# Patient Record
Sex: MALE | HISPANIC OR LATINO | Employment: FULL TIME | URBAN - METROPOLITAN AREA
[De-identification: names, ages, dates, MRNs, and addresses within clinical notes are randomized per-mention and may not be internally consistent; named-entity substitution may affect disease eponyms.]

---

## 2017-11-03 ENCOUNTER — OFFICE VISIT (OUTPATIENT)
Dept: MEDICAL GROUP | Facility: MEDICAL CENTER | Age: 22
End: 2017-11-03
Payer: COMMERCIAL

## 2017-11-03 VITALS
WEIGHT: 225 LBS | BODY MASS INDEX: 35.31 KG/M2 | DIASTOLIC BLOOD PRESSURE: 82 MMHG | RESPIRATION RATE: 16 BRPM | HEIGHT: 67 IN | TEMPERATURE: 97.8 F | OXYGEN SATURATION: 94 % | SYSTOLIC BLOOD PRESSURE: 130 MMHG | HEART RATE: 87 BPM

## 2017-11-03 DIAGNOSIS — G47.9 SLEEP DISTURBANCE: ICD-10-CM

## 2017-11-03 DIAGNOSIS — Z81.1: ICD-10-CM

## 2017-11-03 DIAGNOSIS — E66.9 OBESITY (BMI 30-39.9): ICD-10-CM

## 2017-11-03 DIAGNOSIS — F41.9 ANXIETY: ICD-10-CM

## 2017-11-03 DIAGNOSIS — F32.A FATIGUE DUE TO DEPRESSION: ICD-10-CM

## 2017-11-03 DIAGNOSIS — Z78.9 ALCOHOL USE: ICD-10-CM

## 2017-11-03 DIAGNOSIS — Z91.52: ICD-10-CM

## 2017-11-03 DIAGNOSIS — R53.83 FATIGUE DUE TO DEPRESSION: ICD-10-CM

## 2017-11-03 DIAGNOSIS — F33.2 SEVERE EPISODE OF RECURRENT MAJOR DEPRESSIVE DISORDER, WITHOUT PSYCHOTIC FEATURES (HCC): ICD-10-CM

## 2017-11-03 PROCEDURE — 99204 OFFICE O/P NEW MOD 45 MIN: CPT | Performed by: NURSE PRACTITIONER

## 2017-11-03 RX ORDER — BUPROPION HYDROCHLORIDE 150 MG/1
150 TABLET ORAL EVERY MORNING
Qty: 90 TAB | Refills: 0 | Status: SHIPPED | OUTPATIENT
Start: 2017-11-03 | End: 2017-12-15 | Stop reason: SDUPTHER

## 2017-11-03 ASSESSMENT — PATIENT HEALTH QUESTIONNAIRE - PHQ9
SUM OF ALL RESPONSES TO PHQ QUESTIONS 1-9: 22
CLINICAL INTERPRETATION OF PHQ2 SCORE: 3
5. POOR APPETITE OR OVEREATING: 3 - NEARLY EVERY DAY

## 2017-11-03 NOTE — LETTER
[facility]  AJ SantosP.R.N.  [address] [facility] [address]  [place] [address]  Fax: [phone]   Authorization for Release/Disclosure of   Protected Health Information   Name: CIRO RIOS : 1995 SSN: [id number]   Address: [address]  [place] [address] Phone:    [phone] (home)    I authorize the entity listed below to release/disclose the PHI below to:   [facility]/Sumi Walker A.P.R.N. and CONG Santos.P.R.N.   Provider or Entity Name:  [facility]   Address   City, State, Zip  [place] Phone:      Fax:     Reason for request: continuity of care   Information to be released:    [  ] LAST COLONOSCOPY,  including any PATH REPORT and follow-up  [  ] LAST FIT/COLOGUARD RESULT [  ] LAST DEXA  [  ] LAST MAMMOGRAM  [  ] LAST PAP  [  ] LAST LABS [  ] RETINA EXAM REPORT  [  ] IMMUNIZATION RECORDS  [ x ] Release all info      [  ] Check here and initial the line next to each item to release ALL health information INCLUDING  _____ Care and treatment for drug and / or alcohol abuse  _____ HIV testing, infection status, or AIDS  _____ Genetic Testing    DATES OF SERVICE OR TIME PERIOD TO BE DISCLOSED: _____________  I understand and acknowledge that:  * This Authorization may be revoked at any time by you in writing, except if your health information has already been used or disclosed.  * Your health information that will be used or disclosed as a result of you signing this authorization could be re-disclosed by the recipient. If this occurs, your re-disclosed health information may no longer be protected by State or Federal laws.  * You may refuse to sign this Authorization. Your refusal will not affect your ability to obtain treatment.  * This Authorization becomes effective upon signing and will  on (date) __________.      If no date is indicated, this Authorization will  one (1) year from the signature date.    Name: Ciro Rios    Signature:   Date:     11/3/2017       PLEASE FAX REQUESTED RECORDS BACK TO: [phone]

## 2017-11-03 NOTE — PROGRESS NOTES
CC: Depression      Ciro Rios is a 21 y.o. male here to establish care and to discuss the evaluation and management of:    1. Severe episode of recurrent major depressive disorder, without psychotic features (CMS-HCC)  2. Anxiety  3. Hx of self mutilation  New problem. Patient states that he has been suffering from depression and anxiety for several years. States he feels tired, has had weight gain, difficulty sleeping, feelings of worthlessness, distraction, excessive worry, and difficulty concentrating. He states that he has been going to substance abuse counseling which was court ordered from a DUI he received over 2 years ago. He states that he has gotten extension on completing this court mandated counseling which she goes to once a week for approximately 1 hour. Says that he feels like counseling is helpful for him. Substance of choice was marijuana. He states that he has been trying to quit smoking marijuana, and decrease his alcohol consumption. States he has approximately 40 ounces of malt liquor every other day, and has decreased his marijuana use, last use was about 2 weeks ago. Patient states that more than 2 years ago he had tried Effexor for a few months and states that it was not effective. No suicidal ideations at this time, denies a plan, denies auditory hallucinations, or visual hallucinations. However states that he told his substance abuse counselor that he feels like that would be the way that he would die. Patient admits to self inflicting by cutting on his arm, last episode of cutting was a few months ago. States that he has some family disruption as his father is an alcoholic, and has health problems. He reports that his parents seem to argue, especially when his dad is drinking alcohol. He lives does have a few siblings however he is the only one that currently lives at home. 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 However does state  that he has some lack of focus, decreased ability to concentrate while he is at work. He states that his counselor had recommended that he see a memory care doctor to potentially start a medication.  4. Alcohol use: Patient states that he has presently one 40 ounce malt liquor every other day. States because he decreased his marijuana use sometimes he goes and has some malt liquor instead.  5. Fatigue due to depression   6. Sleep disturbance   States that he can sometimes sleep for as long as 12 hours, however sometimes states that he often gets approximately 3 hours of sleep.    7. Obesity (BMI 30-39.9)  States that he has gained weight over the last year two. He currently is not active, and has a poor appetite. States increased excess calories due to smoking marijuana. States that he is interested in going to exercise, he does have a gym membership however does not go currently. States he has no motivation.      ROS:  Denies any Headache,Chest pain,  Shortness of breath,  Abdominal pain, Changes of bowel or bladder, Lower ext edema, Fevers, Nights sweats, Weight Changes, Focal weakness or numbness.      Current Outpatient Prescriptions:   •  buPROPion (WELLBUTRIN XL) 150 MG XL tablet, Take 1 Tab by mouth every morning., Disp: 90 Tab, Rfl: 0    No Known Allergies    History reviewed. No pertinent past medical history.  History reviewed. No pertinent surgical history.  Family History   Problem Relation Age of Onset   • Alcohol abuse Father    • No Known Problems Sister      kidney tranplants   • Heart Attack Maternal Grandfather    • Diabetes Paternal Grandmother    • No Known Problems Paternal Grandfather      parkinsons     Social History     Social History   • Marital status: Single     Spouse name: N/A   • Number of children: N/A   • Years of education: N/A     Occupational History   • Not on file.     Social History Main Topics   • Smoking status: Former Smoker     Packs/day: 0.25     Years: 5.00     Types:  "Cigarettes     Quit date: 10/20/2017   • Smokeless tobacco: Never Used   • Alcohol use Yes      Comment: 40 oz malt /every other day   • Drug use: No      Comment: stopped 2 wks ago   • Sexual activity: Not Currently     Other Topics Concern   • Not on file     Social History Narrative    Works at [employer] as a manager       Objective:     Vitals: /82   Pulse 87   Temp 36.6 °C (97.8 °F)   Resp 16   Ht 1.702 m (5' 7\")   Wt 102.1 kg (225 lb)   SpO2 94%   BMI 35.24 kg/m²      General: Alert, pleasant, NAD  HEENT:  Normocephalic.  PERRL, no icterus or pallor.  Conjunctivae and lids normal.  External ears normal. Tympanic membranes pearly, opaque.  Nares patent, mucosa pink.  Oropharynx non-erythematous, mucous membranes moist.  Neck supple.  No thyromegaly or masses palpated. No cervical or supraclavicular lymphadenopathy.  Heart:  Regular rate and rhythm.  S1 and S2 normal.  No murmurs appreciated.  Respiratory:  Normal respiratory effort.  Clear to auscultation bilaterally.    Abdomen:  Bowel sounds present.  Non-distended, soft, non-tender, no guarding/rebound. No hepatosplenomegaly.  No hernias.  Skin:  Warm, dry, no rashes, scars on left inner forearm due to cutting. Healed, no erythema.  Musculoskeletal:  Gait is normal.  Moves all extremities well.  Extremities:  Pedal pulses 2+ symmetric. No leg edema.  Neurological: No tremors, sensation grossly intact, patellar and biceps reflexes 2+ symmetric, tone/strength normal, gait is normal, no clonus  Psych:  Affect/mood is normal, judgement is good, memory is intact, grooming is appropriate.Flat affect      Assessment and Plan.   21 y.o. male here to establish care and discuss the followin. Severe episode of recurrent major depressive disorder, without psychotic features (CMS-HCC)  2. Anxiety  3. Hx of self mutilation  Upon taking the PQH9-he scored a 24, on the JOHANNA score 21. Based on patient's reported symptoms he would potentially benefit from " counseling and a antidepressant. He does not have any suicidal ideations at this current time, nor a plan. Pt does have scars on left upper forearm. Denies any auditory or visual hallucinations,   he willcontinue his substance abuse counseling as ordered. Also referred also over to behavioral health for when the court ordered counseling has discontinued. Patient would benefit from psychological therapeutic assistance.  Prescription written for Wellbutrin  daily. Will follow up in 4-6 weeks to follow-up on medication .  - REFERRAL TO BEHAVIORAL HEALTH  4. Alcohol use  Active. Not a binge drinker, does have one 40 ounce every other day. Discussed the importance of decreasing alcohol intake as this can increase his depressive symptoms. Also, his father drinks in excess. Discussed implications of substituting 1 addictive substance for another as this can exacerbate    addiction. Patient will attempt to decrease alcohol consumption.  5. Fatigue due to depression  Not well managed at this time. Hopefully after some time on the new medication he will be able to develop a better sleep pattern. Discussed waking up at a set time every single day to take his medication discussed decreasing caffeine intake.  - CBC WITHOUT DIFFERENTIAL; Future  - TSH WITH REFLEX TO FT4; Future    6. Sleep disturbance  Not well managed. Discussed importance of implementing sleep hygiene. We will rule out any physiological associations.  - CBC WITHOUT DIFFERENTIAL; Future  - TSH WITH REFLEX TO FT4; Future    7. Obesity (BMI 30-39.9)  Chronic. Discussed with patient healthy eating habits, decreasing sugary drinks, soda, hypertension, decreasing alcohol intake, and decreasing marijuana use. Discussed beginning exercises with her be walking for 10-20 minutes every day to help improve his cardiovascular system and to decrease weight. Discussed how this can also help improve his mood if he began exercising on a routine basis. Patient states that  he will try to incorporate this in his daily activities.  - Patient identified as having weight management issue.  Appropriate orders and counseling given.      Return in about 6 weeks (around 12/15/2017) for Med Check.        Sumi LAGUNA.

## 2017-12-15 ENCOUNTER — OFFICE VISIT (OUTPATIENT)
Dept: MEDICAL GROUP | Facility: MEDICAL CENTER | Age: 22
End: 2017-12-15
Payer: COMMERCIAL

## 2017-12-15 VITALS
TEMPERATURE: 98.6 F | DIASTOLIC BLOOD PRESSURE: 80 MMHG | WEIGHT: 217 LBS | SYSTOLIC BLOOD PRESSURE: 122 MMHG | RESPIRATION RATE: 16 BRPM | BODY MASS INDEX: 34.06 KG/M2 | HEART RATE: 82 BPM | OXYGEN SATURATION: 94 % | HEIGHT: 67 IN

## 2017-12-15 DIAGNOSIS — F32.A FATIGUE DUE TO DEPRESSION: ICD-10-CM

## 2017-12-15 DIAGNOSIS — R53.83 FATIGUE DUE TO DEPRESSION: ICD-10-CM

## 2017-12-15 DIAGNOSIS — Z78.9 ALCOHOL USE: ICD-10-CM

## 2017-12-15 DIAGNOSIS — F41.9 ANXIETY: ICD-10-CM

## 2017-12-15 DIAGNOSIS — F33.2 SEVERE EPISODE OF RECURRENT MAJOR DEPRESSIVE DISORDER, WITHOUT PSYCHOTIC FEATURES (HCC): ICD-10-CM

## 2017-12-15 PROCEDURE — 99214 OFFICE O/P EST MOD 30 MIN: CPT | Performed by: NURSE PRACTITIONER

## 2017-12-15 RX ORDER — BUPROPION HYDROCHLORIDE 300 MG/1
300 TABLET ORAL EVERY MORNING
Qty: 30 TAB | Refills: 1 | Status: SHIPPED | OUTPATIENT
Start: 2017-12-15 | End: 2018-11-25 | Stop reason: CLARIF

## 2017-12-15 ASSESSMENT — PATIENT HEALTH QUESTIONNAIRE - PHQ9
SUM OF ALL RESPONSES TO PHQ QUESTIONS 1-9: 17
5. POOR APPETITE OR OVEREATING: 1 - SEVERAL DAYS
CLINICAL INTERPRETATION OF PHQ2 SCORE: 4

## 2017-12-15 NOTE — PATIENT INSTRUCTIONS
Bupropion extended-release tablets (Depression/Mood Disorders)  What is this medicine?  BUPROPION (byoo PROE pee on) is used to treat depression.  This medicine may be used for other purposes; ask your health care provider or pharmacist if you have questions.  COMMON BRAND NAME(S): Aplenzin, Budeprion XL , Forfivo XL, Wellbutrin XL  What should I tell my health care provider before I take this medicine?  They need to know if you have any of these conditions:  -an eating disorder, such as anorexia or bulimia  -bipolar disorder or psychosis  -diabetes or high blood sugar, treated with medication  -head injury or brain tumor  -heart disease, previous heart attack, or irregular heart beat  -high blood pressure  -kidney or liver disease  -seizures (convulsions)  -suicidal thoughts or a previous suicide attempt  -Tourette's syndrome  -weight loss  -an unusual or allergic reaction to bupropion, other medicines, foods, dyes, or preservatives  -breast-feeding  -pregnant or trying to become pregnant  How should I use this medicine?  Take this medicine by mouth with a glass of water. Follow the directions on the prescription label. You can take it with or without food. If it upsets your stomach, take it with food. Do not crush, chew, or cut these tablets. This medicine is taken once daily at the same time each day. Do not take your medicine more often than directed. Do not stop taking this medicine suddenly except upon the advice of your doctor. Stopping this medicine too quickly may cause serious side effects or your condition may worsen.  A special MedGuide will be given to you by the pharmacist with each prescription and refill. Be sure to read this information carefully each time.  Talk to your pediatrician regarding the use of this medicine in children. Special care may be needed.  Overdosage: If you think you have taken too much of this medicine contact a poison control center or emergency room at once.  NOTE: This  medicine is only for you. Do not share this medicine with others.  What if I miss a dose?  If you miss a dose, skip the missed dose and take your next tablet at the regular time. Do not take double or extra doses.  What may interact with this medicine?  Do not take this medicine with any of the following medications:  -linezolid  -MAOIs like Azilect, Carbex, Eldepryl, Marplan, Nardil, and Parnate  -methylene blue (injected into a vein)  -other medicines that contain bupropion like Zyban  This medicine may also interact with the following medications:  -alcohol  -certain medicines for anxiety or sleep  -certain medicines for blood pressure like metoprolol, propranolol  -certain medicines for depression or psychotic disturbances  -certain medicines for HIV or AIDS like efavirenz, lopinavir, nelfinavir, ritonavir  -certain medicines for irregular heart beat like propafenone, flecainide  -certain medicines for Parkinson's disease like amantadine, levodopa  -certain medicines for seizures like carbamazepine, phenytoin, phenobarbital  -cimetidine  -clopidogrel  -cyclophosphamide  -furazolidone  -isoniazid  -nicotine  -orphenadrine  -procarbazine  -steroid medicines like prednisone or cortisone  -stimulant medicines for attention disorders, weight loss, or to stay awake  -tamoxifen  -theophylline  -thiotepa  -ticlopidine  -tramadol  -warfarin  This list may not describe all possible interactions. Give your health care provider a list of all the medicines, herbs, non-prescription drugs, or dietary supplements you use. Also tell them if you smoke, drink alcohol, or use illegal drugs. Some items may interact with your medicine.  What should I watch for while using this medicine?  Tell your doctor if your symptoms do not get better or if they get worse. Visit your doctor or health care professional for regular checks on your progress. Because it may take several weeks to see the full effects of this medicine, it is important  to continue your treatment as prescribed by your doctor.  Patients and their families should watch out for new or worsening thoughts of suicide or depression. Also watch out for sudden changes in feelings such as feeling anxious, agitated, panicky, irritable, hostile, aggressive, impulsive, severely restless, overly excited and hyperactive, or not being able to sleep. If this happens, especially at the beginning of treatment or after a change in dose, call your health care professional.  Avoid alcoholic drinks while taking this medicine. Drinking large amounts of alcoholic beverages, using sleeping or anxiety medicines, or quickly stopping the use of these agents while taking this medicine may increase your risk for a seizure.  Do not drive or use heavy machinery until you know how this medicine affects you. This medicine can impair your ability to perform these tasks.  Do not take this medicine close to bedtime. It may prevent you from sleeping.  Your mouth may get dry. Chewing sugarless gum or sucking hard candy, and drinking plenty of water may help. Contact your doctor if the problem does not go away or is severe.  The tablet shell for some brands of this medicine does not dissolve. This is normal. The tablet shell may appear whole in the stool. This is not a cause for concern.  What side effects may I notice from receiving this medicine?  Side effects that you should report to your doctor or health care professional as soon as possible:  -allergic reactions like skin rash, itching or hives, swelling of the face, lips, or tongue  -breathing problems  -changes in vision  -confusion  -fast or irregular heartbeat  -hallucinations  -increased blood pressure  -redness, blistering, peeling or loosening of the skin, including inside the mouth  -seizures  -suicidal thoughts or other mood changes  -unusually weak or tired  -vomiting  Side effects that usually do not require medical attention (report to your doctor or  health care professional if they continue or are bothersome):  -change in sex drive or performance  -constipation  -headache  -loss of appetite  -nausea  -tremors  -weight loss  This list may not describe all possible side effects. Call your doctor for medical advice about side effects. You may report side effects to FDA at 3-521-ZYS-2325.  Where should I keep my medicine?  Keep out of the reach of children.  Store at room temperature between 15 and 30 degrees C (59 and 86 degrees F). Throw away any unused medicine after the expiration date.  NOTE: This sheet is a summary. It may not cover all possible information. If you have questions about this medicine, talk to your doctor, pharmacist, or health care provider.  © 2014, [employer]. (5/6/2013 2:02:06 PM)  Depression, Adult  Depression refers to feeling sad, low, down in the dumps, blue, gloomy, or empty. In general, there are two kinds of depression:  1. Normal sadness or normal grief. This kind of depression is one that we all feel from time to time after upsetting life experiences, such as the loss of a job or the ending of a relationship. This kind of depression is considered normal, is short lived, and resolves within a few days to 2 weeks. Depression experienced after the loss of a loved one (bereavement) often lasts longer than 2 weeks but normally gets better with time.  2. Clinical depression. This kind of depression lasts longer than normal sadness or normal grief or interferes with your ability to function at home, at work, and in school. It also interferes with your personal relationships. It affects almost every aspect of your life. Clinical depression is an illness.  Symptoms of depression can also be caused by conditions other than those mentioned above, such as:  · Physical illness. Some physical illnesses, including underactive thyroid gland (hypothyroidism), severe anemia, specific types of cancer, diabetes, uncontrolled seizures,  heart and lung problems, strokes, and chronic pain are commonly associated with symptoms of depression.  · Side effects of some prescription medicine. In some people, certain types of medicine can cause symptoms of depression.  · Substance abuse. Abuse of alcohol and illicit drugs can cause symptoms of depression.  SYMPTOMS  Symptoms of normal sadness and normal grief include the following:  · Feeling sad or crying for short periods of time.  · Not caring about anything (apathy).  · Difficulty sleeping or sleeping too much.  · No longer able to enjoy the things you used to enjoy.  · Desire to be by oneself all the time (social isolation).  · Lack of energy or motivation.  · Difficulty concentrating or remembering.  · Change in appetite or weight.  · Restlessness or agitation.  Symptoms of clinical depression include the same symptoms of normal sadness or normal grief and also the following symptoms:  · Feeling sad or crying all the time.  · Feelings of guilt or worthlessness.  · Feelings of hopelessness or helplessness.  · Thoughts of suicide or the desire to harm yourself (suicidal ideation).  · Loss of touch with reality (psychotic symptoms). Seeing or hearing things that are not real (hallucinations) or having false beliefs about your life or the people around you (delusions and paranoia).  DIAGNOSIS   The diagnosis of clinical depression is usually based on how bad the symptoms are and how long they have lasted. Your health care provider will also ask you questions about your medical history and substance use to find out if physical illness, use of prescription medicine, or substance abuse is causing your depression. Your health care provider may also order blood tests.  TREATMENT   Often, normal sadness and normal grief do not require treatment. However, sometimes antidepressant medicine is given for bereavement to ease the depressive symptoms until they resolve.  The treatment for clinical depression depends  on how bad the symptoms are but often includes antidepressant medicine, counseling with a mental health professional, or both. Your health care provider will help to determine what treatment is best for you.  Depression caused by physical illness usually goes away with appropriate medical treatment of the illness. If prescription medicine is causing depression, talk with your health care provider about stopping the medicine, decreasing the dose, or changing to another medicine.  Depression caused by the abuse of alcohol or illicit drugs goes away when you stop using these substances. Some adults need professional help in order to stop drinking or using drugs.  SEEK IMMEDIATE MEDICAL CARE IF:  · You have thoughts about hurting yourself or others.  · You lose touch with reality (have psychotic symptoms).  · You are taking medicine for depression and have a serious side effect.  FOR MORE INFORMATION  · National [place] on Mental Illness: www.[place].org   · National [place] of Mental Health: www.nimh.nih.gov      This information is not intended to replace advice given to you by your health care provider. Make sure you discuss any questions you have with your health care provider.     Document Released: 12/15/2001 Document Revised: 01/08/2016 Document Reviewed: 03/18/2013  [employer] Interactive Patient Education ©2016 [employer]

## 2017-12-15 NOTE — PROGRESS NOTES
"cc: Follow-up depression    Subjective:     HPI:     Ciro Rios is a 22 y.o. male here to discuss the evaluation and management of:    1. Severe episode of recurrent major depressive disorder, without psychotic features (CMS-HCC)  2. Anxiety  3. Fatigue due to depression  Patient states that he feels like his depression is \"less intense.\" Patient states he's been taking his Wellbutrin  daily without any side effects. States his sleep hasn't improved that much. States that he still has some anxiety especially at work when there is a long line of customers. States that when he becomes overwhelmed he needs to go outside or in the bathroom to clear his head. States that this can happen daily or not at all during the week. States that he still sometimes have self-mutilation/suicidal thoughts however does not have a plan for suicide, there are no guns in the home per patient. States he still lives with his parents and they continue to argue a lot. He still is going to substance abuse counseling at [facility]. States he had to switch counselors and knees going once a week. States that this will last approximately until March.    4. Alcohol use  Patient states that he still consumes alcohol, also reports decreased amount. States he has approximately 2 beers per week.        ROS:  Denies any Headache, Blurred Vision, Confusion Chest pain,  Shortness of breath,  Abdominal pain, Changes of bowel or bladder, Lower ext edema, Fevers, Nights sweats, Weight Changes, Focal weakness or numbness.  All other systems are negative.        Current Outpatient Prescriptions:   •  buPROPion (WELLBUTRIN XL) 300 MG XL tablet, Take 1 Tab by mouth every morning., Disp: 30 Tab, Rfl: 1    No Known Allergies    History reviewed. No pertinent past medical history.  History reviewed. No pertinent surgical history.  Family History   Problem Relation Age of Onset   • Alcohol abuse Father    • No Known Problems Sister      kidney tranplants   • " "Heart Attack Maternal Grandfather    • Diabetes Paternal Grandmother    • No Known Problems Paternal Grandfather      parkinsons     Social History     Social History   • Marital status: Single     Spouse name: N/A   • Number of children: N/A   • Years of education: N/A     Occupational History   • Not on file.     Social History Main Topics   • Smoking status: Former Smoker     Packs/day: 0.25     Years: 5.00     Types: Cigarettes     Quit date: 10/20/2017   • Smokeless tobacco: Never Used   • Alcohol use Yes      Comment: 40 oz malt /every other day   • Drug use:      Types: Marijuana   • Sexual activity: Not Currently     Other Topics Concern   • Not on file     Social History Narrative    Works at [employer] as a manager       Objective:     Vitals: /80   Pulse 82   Temp 37 °C (98.6 °F)   Resp 16   Ht 1.702 m (5' 7\")   Wt 98.4 kg (217 lb)   SpO2 94%   BMI 33.99 kg/m²    General: Alert, pleasant, NAD  HEENT: Normocephalic. Neck supple.  No thyromegaly or masses palpated. No cervical or supraclavicular lymphadenopathy.  Heart: Regular rate and rhythm.  S1 and S2 normal.  No murmurs appreciated.  Respiratory: Normal respiratory effort.  Clear to auscultation bilaterally.  Skin: Warm, dry, no rashes.  Musculoskeletal: Gait is normal.  Moves all extremities well.  Extremities: No leg edema.   Neurological: No tremors, sensation grossly intact  Psych:  Judgement is good, memory is intact, grooming is appropriate. Flat affect, withdrawn    Assessment/Plan:     Ciro was seen today for follow-up.    Diagnoses and all orders for this visit:    Severe episode of recurrent major depressive disorder, without psychotic features (CMS-HCC)  Anxiety  Fatigue due to depression  Chronic. Scored a 17 on the PQH9 at this visit, last visit scored 24. Depression improving however anxiety remains the same. Continues to have intermittent thoughts of harming himself however does not have a plan, no guns in the home. After " discussing with patient and agreed that we will increase the dose of his Wellbutrin at this time and then follow up to reassess. Increase Wellbutrin to 300 mg daily follow-up in 4 weeks to assess symptoms and if possibility of changing to new medication that can help with anxiety reduction as well. Continue weekly counseling with substance abuse counselor. Patient states he still has referral that was previously placed to behavioral health however does not want to start that yet as he will finish his substance abuse counseling 1st. Patient agreeable to this plan.    -     buPROPion (WELLBUTRIN XL) 300 MG XL tablet; Take 1 Tab by mouth every morning.  -     Patient has been identified as being depressed and appropriate orders and counseling have been given      Alcohol use  Chronic. Patient states is approximately having 2 beers per week, advised against this. Discussed with patient that it is not advised to consume excessive amounts of alcohol especially with his severe depression. Discussed with patient that increasing alcohol intake may increase his depression and subsequently increasing suicidal ideations. Try to reduce alcohol intake.       Health care Maintenance: Labs still pending from previous visit.    Return in about 4 weeks (around 1/12/2018) for Med Check.          Sumi NUNEZ

## 2018-11-24 PROCEDURE — 99285 EMERGENCY DEPT VISIT HI MDM: CPT

## 2018-11-25 ENCOUNTER — APPOINTMENT (OUTPATIENT)
Dept: RADIOLOGY | Facility: MEDICAL CENTER | Age: 23
End: 2018-11-25
Attending: EMERGENCY MEDICINE
Payer: COMMERCIAL

## 2018-11-25 ENCOUNTER — HOSPITAL ENCOUNTER (EMERGENCY)
Facility: MEDICAL CENTER | Age: 23
End: 2018-11-26
Attending: EMERGENCY MEDICINE
Payer: COMMERCIAL

## 2018-11-25 DIAGNOSIS — F12.10 MARIJUANA ABUSE: ICD-10-CM

## 2018-11-25 DIAGNOSIS — S60.222A CONTUSION OF LEFT HAND, INITIAL ENCOUNTER: ICD-10-CM

## 2018-11-25 DIAGNOSIS — R45.851 SUICIDAL IDEATION: ICD-10-CM

## 2018-11-25 LAB
AMPHET UR QL SCN: NEGATIVE
BARBITURATES UR QL SCN: NEGATIVE
BENZODIAZ UR QL SCN: NEGATIVE
BZE UR QL SCN: NEGATIVE
CANNABINOIDS UR QL SCN: POSITIVE
METHADONE UR QL SCN: NEGATIVE
OPIATES UR QL SCN: NEGATIVE
OXYCODONE UR QL SCN: NEGATIVE
PCP UR QL SCN: NEGATIVE
POC BREATHALIZER: 0.01 PERCENT (ref 0–0.01)
PROPOXYPH UR QL SCN: NEGATIVE

## 2018-11-25 PROCEDURE — 73130 X-RAY EXAM OF HAND: CPT | Mod: LT

## 2018-11-25 PROCEDURE — 700102 HCHG RX REV CODE 250 W/ 637 OVERRIDE(OP): Performed by: EMERGENCY MEDICINE

## 2018-11-25 PROCEDURE — A9270 NON-COVERED ITEM OR SERVICE: HCPCS | Performed by: EMERGENCY MEDICINE

## 2018-11-25 PROCEDURE — 80307 DRUG TEST PRSMV CHEM ANLYZR: CPT

## 2018-11-25 PROCEDURE — 302970 POC BREATHALIZER: Performed by: EMERGENCY MEDICINE

## 2018-11-25 PROCEDURE — 90791 PSYCH DIAGNOSTIC EVALUATION: CPT

## 2018-11-25 RX ORDER — LORAZEPAM 1 MG/1
2 TABLET ORAL ONCE
Status: COMPLETED | OUTPATIENT
Start: 2018-11-25 | End: 2018-11-25

## 2018-11-25 RX ORDER — LORAZEPAM 1 MG/1
1 TABLET ORAL EVERY 6 HOURS PRN
Status: DISCONTINUED | OUTPATIENT
Start: 2018-11-25 | End: 2018-11-26

## 2018-11-25 RX ADMIN — LORAZEPAM 2 MG: 1 TABLET ORAL at 01:15

## 2018-11-25 RX ADMIN — LORAZEPAM 1 MG: 1 TABLET ORAL at 18:02

## 2018-11-25 RX ADMIN — LORAZEPAM 1 MG: 1 TABLET ORAL at 10:04

## 2018-11-25 NOTE — ED PROVIDER NOTES
ED Provider Note    11/25/18  1056  11/25/18  0613  11/25/18  0051  11/25/18  0011        Vital Signs     Temp  36.1 °C (96.9 °F)  36.5 °C (97.7 °F)         Temp src  Temporal  Temporal         BP  128/79  120/63         Patient BP Position  Sitting           BP Location  Right;Upper Arm  Left;Upper Arm         BP Method  Automatic  Automatic         Pulse  101  102         Resp  16  14         SpO2    96 %         O2 (LPM)    0         I have ordered as needed oral Ativan every 6 hours.  The vital signs were noted to be consistent with mild tachycardia.  The patient has been eating and drinking.  No active issues currently we are still awaiting acceptance at a psychiatric facility

## 2018-11-25 NOTE — ED PROVIDER NOTES
"ED Provider Note    Scribed for Darline Brown M.D. by Adrian Justice. 11/25/2018  12:18 AM    Means of arrival: Walk in  History obtained from: Patient  History limited by: None      CHIEF COMPLAINT  Chief Complaint   Patient presents with   • Psych Eval     pt states he \"wont live throught this\" \"its not worth it anymore\" when asked if he felt like hurting himself pt states \"yeah, I wont go through 2016 again! I just want to die\"   • Suicidal Ideation       HPI  Ciro Rios is a 23 y.o. male with history of untreated depression and anxiety who presents to the Emergency Department for evaluation of suicidal ideations after a stressful situation at work. He states he was accused of stealing at work, developed increasing anxiety, and punched a wall with his left hand at that time. The patient reports a plan to jump off a tall parking complex, and has contemplated jumping off the parking complex in the past. He also has prior history of a suicide attempt via hanging, but the rope broke, and the patient did not seek psychiatric help at that time. The patient states he has tried psychiatric medications in the past with no alleviation, but repeatedly used marijuana to alleviate his anxiety. He has noticed his depression is exacerbated when he is alone in dark rooms. The patient reports no alcohol use tonight. He denies associated homicidal ideations. The patient also denies fever, cough, diarrhea, constipation, or dysuria.     REVIEW OF SYSTEMS  Pertinent positive include suicidal ideations, depression, and anxiety. Pertinent negative include homicidal ideations, fever, cough, diarrhea, constipation, or dysuria. All other systems reviewed and are negative.      PAST MEDICAL HISTORY   has a past medical history of Psychiatric disorder.    SOCIAL HISTORY  Social History     Social History Main Topics   • Smoking status: Former Smoker     Packs/day: 0.25     Years: 5.00     Types: Cigarettes     Quit date: 10/20/2017   • " Smokeless tobacco: Never Used   • Alcohol use Yes      Comment: 40 oz malt /every other day   • Drug use: Yes     Types: Marijuana, Inhaled      Comment: THC   • Sexual activity: Not Currently       SURGICAL HISTORY  patient denies any surgical history    CURRENT MEDICATIONS  Home Medications     Reviewed by Neal Santana R.N. (Registered Nurse) on 11/25/18 at 0038  Med List Status: Partial   Medication Last Dose Status   buPROPion (WELLBUTRIN XL) 300 MG XL tablet  Active                ALLERGIES  No Known Allergies    PHYSICAL EXAM   VITAL SIGNS: /96   Pulse (!) 130   Temp 36.3 °C (97.3 °F) (Temporal)   Resp (!) 22   Wt 93.5 kg (206 lb 2.1 oz)   SpO2 96%   BMI 32.28 kg/m²    Constitutional: Non toxic appearing male.  Alert in no apparent distress.  HENT: Normocephalic, Atraumatic. Bilateral external ears normal. Nose normal.  Moist mucous membranes.  Oropharynx clear.  Eyes: Pupils are equal and reactive. Conjunctiva normal.   Neck: Supple, full range of motion  Heart: Regular rate and rhythm.  No murmurs.    Lungs: No respiratory distress, normal work of breathing. Lungs clear to auscultation bilaterally.  Abdomen Soft, no distention.  No tenderness to palpation.  Musculoskeletal: Left hand with mild swelling over dorsum of hand and scattered superficial abrasions. No lower extremity edema.  Skin: Warm, Dry.  No erythema, No rash.   Neurologic: Alert and oriented x3. Moving all extremities spontaneously without focal deficits.  Psychiatric: Anxious appearing. Endorses suicidal ideations, denies homicidal ideations. No evidence of psychosis.     DIAGNOSTIC STUDIES    LABS  Personally reviewed by me  Labs Reviewed   POC BREATHALIZER - Normal   URINE DRUG SCREEN         RADIOLOGY  Personally reviewed by me  DX-HAND 3+ LEFT   Final Result      No radiographic evidence of acute traumatic injury.          ED COURSE  Vitals:    11/24/18 2332 11/24/18 2352 11/25/18 0613   BP: 140/96  120/63   Pulse: (!)  130  (!) 102   Resp: (!) 22  14   Temp: 36.3 °C (97.3 °F)  36.5 °C (97.7 °F)   TempSrc: Temporal  Temporal   SpO2: 96%  96%   Weight:  93.5 kg (206 lb 2.1 oz)          Medications administered:  Medications   LORazepam (ATIVAN) tablet 2 mg (2 mg Oral Given 11/25/18 0115)         12:18 AM Patient seen and examined at bedside. The patient presents with suicidal ideations. Ordered for DX-Hand, POC Breathalizer, and Urine Drug Screen to evaluate. Behavioral health will consult with the patient.    MEDICAL DECISION MAKING  Patient with long-standing history of depression and anxiety, not on any meds patients, who presents with suicidal ideation.  He is tachycardic on arrival with otherwise normal vital signs and anxious appearing.  The patient did punch a wall this evening and has evidence of left hand contusion.  X-rays negative for fracture or dislocation.  Patient otherwise has no other medical complaints and will be medically cleared at this time.  Due to his suicidal ideation and history of attempts in the past, he will be placed on mental health hold.  Patient understands plan of care and is somewhat cooperative at this time.    12:30 AM - Spoke with Behavioral Health recommends patient be placed on mental health hold. Will place patient on legal hold.      IMPRESSION  (R45.851) Suicidal ideation  (F12.10) Marijuana abuse  (S60.222A) Contusion of left hand, initial encounter     DISPOSITION - awaiting transfer to inpatient psychiatric care.    Results, diagnoses, and treatment options were discussed with the patient and/or family. Patient verbalized understanding of plan of care.    New Prescriptions    No medications on file        Adrian VAZQUEZ), am scribing for, and in the presence of, Darline Brown M.D..    Electronically signed by: Adrian Charlton), 11/25/2018    IDarline M.D. personally performed the services described in this documentation, as scribed by Adrian Justice in my presence,  and it is both accurate and complete.    C.    The note accurately reflects work and decisions made by me.  Darline Brown  11/25/2018  7:18 AM

## 2018-11-25 NOTE — ED NOTES
"Chief Complaint   Patient presents with   • Psych Eval     pt states he \"wont live throught this\" \"its not worth it anymore\" when asked if he felt like hurting himself pt states \"yeah, I wont go through 2016 again! I just want to die\"   • Suicidal Ideation       Pt to green 32.  Pt endorses thoughts of SI to this RN with a plan to jump off of a parking garage and not being able to hold himself back. Pt endorses previous suicide attempt by hanging himself but the structure to which the rope was attached dislodged after \"a few seconds.\"        Pt's parents educated on L2K protocol. Parents wish to be updated of pt status, father Jeff Rios at [phone].  "

## 2018-11-25 NOTE — ED NOTES
"Ciro Rios  23 y.o. male  Chief Complaint   Patient presents with   • Psych Eval     pt states he \"wont live throught this\" \"its not worth it anymore\" when asked if he felt like hurting himself pt states \"yeah, I wont go through 2016 again! I just want to die\"   • Suicidal Ideation     /96   Pulse (!) 130   Temp 36.3 °C (97.3 °F) (Temporal)   Resp (!) 22   Wt 93.5 kg (206 lb 2.1 oz)   SpO2 96%   BMI 32.28 kg/m²     Pt amb to triage with steady gait for above complaint. Tearful in triage, parents asked to step out to lobby for assessment.   0.01 Breathalyzer in triage admit to smoking marijuana   SAD SCORE 8  Pt moved to Behavioral health Dr. Bowman for further assessment.   Pt is alert and oriented, speaking in full sentences, follows commands and responds appropriately to questions. NAD. Resp are even and unlabored.      "

## 2018-11-25 NOTE — ED NOTES
Family with pt at bedside.  Spoke with family prior to them coming back to see the pt.  Updated them on status of pt.
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
## 2018-11-25 NOTE — ED NOTES
Med Rec completed per Pt at bedside  Allergies reviewed  No ABX in last 30 days    Pt denies taking any RX's or OTC's    Pt states he has not taken any medications in > 1 year

## 2018-11-25 NOTE — ED NOTES
Pt is becoming restless and pacing the room.  I asked the pt if he would like something to help him relax and pt said yes.  ERP notified

## 2018-11-25 NOTE — DISCHARGE PLANNING
Medical Social Work    Referral: Legal Hold    Intervention: Legal Hold Paperwork given to SW by [facility]    Legal Hold Initiated: Date: 11/25/18 Time: 1249    Patient’s Insurance Listed on Face Sheet: [facility]    [facility]    This referral contains the following information:  1) Face sheet __x__  2) Page 1 and Page 2 of Legal Hold ___x_  3) Alert Team Assessment/Psych Assessment ___x_  4) Head to toe physical exam _x___  5) Urine Drug Screen ___pend_  6) Blood Alcohol __x__  7) Vital signs __x__  8) Pregnancy test when applicable _n/a__  9) Medications list __x__    Plan: Patient will transfer to mental health facility once acceptance is obtained

## 2018-11-25 NOTE — DISCHARGE PLANNING
Awake. Continues to have thoughts of self harm.  States he knows his family loves him but he still feels sad and hopeless. Continue to wait for placement inpatient in a psychiatric facility.

## 2018-11-25 NOTE — DISCHARGE PLANNING
MSW received call from May at [facility]. Pt's insurance in inactive. MSW did search for Medicaid for pt. Pt showing not eligible at this time.

## 2018-11-25 NOTE — ED NOTES
"Pt states \" I don't wanna be here, I would've lied if I knew I had to stay here!\"  Pt educated on L2K process. Pt verbalized understanding. Pt administered meds per MAR and provided blanket.   "

## 2018-11-26 VITALS
RESPIRATION RATE: 16 BRPM | BODY MASS INDEX: 32.28 KG/M2 | TEMPERATURE: 98.6 F | HEART RATE: 103 BPM | SYSTOLIC BLOOD PRESSURE: 130 MMHG | OXYGEN SATURATION: 92 % | WEIGHT: 206.13 LBS | DIASTOLIC BLOOD PRESSURE: 78 MMHG

## 2018-11-26 PROCEDURE — A9270 NON-COVERED ITEM OR SERVICE: HCPCS | Performed by: STUDENT IN AN ORGANIZED HEALTH CARE EDUCATION/TRAINING PROGRAM

## 2018-11-26 PROCEDURE — 700102 HCHG RX REV CODE 250 W/ 637 OVERRIDE(OP): Performed by: EMERGENCY MEDICINE

## 2018-11-26 PROCEDURE — 99284 EMERGENCY DEPT VISIT MOD MDM: CPT | Performed by: PSYCHIATRY & NEUROLOGY

## 2018-11-26 PROCEDURE — A9270 NON-COVERED ITEM OR SERVICE: HCPCS | Performed by: EMERGENCY MEDICINE

## 2018-11-26 PROCEDURE — 700102 HCHG RX REV CODE 250 W/ 637 OVERRIDE(OP): Performed by: STUDENT IN AN ORGANIZED HEALTH CARE EDUCATION/TRAINING PROGRAM

## 2018-11-26 RX ORDER — HYDROXYZINE PAMOATE 50 MG/1
50 CAPSULE ORAL EVERY 6 HOURS PRN
Status: DISCONTINUED | OUTPATIENT
Start: 2018-11-26 | End: 2018-11-26 | Stop reason: HOSPADM

## 2018-11-26 RX ORDER — HYDROXYZINE PAMOATE 50 MG/1
50 CAPSULE ORAL EVERY 6 HOURS PRN
Qty: 90 CAP | Refills: 3 | Status: SHIPPED | OUTPATIENT
Start: 2018-11-26

## 2018-11-26 RX ORDER — HYDROXYZINE PAMOATE 50 MG/1
50 CAPSULE ORAL ONCE
Status: COMPLETED | OUTPATIENT
Start: 2018-11-26 | End: 2018-11-26

## 2018-11-26 RX ORDER — HYDROXYZINE PAMOATE 50 MG/1
50 CAPSULE ORAL EVERY 6 HOURS PRN
Qty: 90 CAP | Refills: 3 | Status: SHIPPED | OUTPATIENT
Start: 2018-11-26 | End: 2018-11-26

## 2018-11-26 RX ADMIN — LORAZEPAM 1 MG: 1 TABLET ORAL at 00:02

## 2018-11-26 RX ADMIN — HYDROXYZINE PAMOATE 50 MG: 50 CAPSULE ORAL at 10:58

## 2018-11-26 NOTE — ED NOTES
Patient's home medications have been reviewed by the pharmacy team.     Past Medical History:   Diagnosis Date   • Psychiatric disorder     depression, Si attempt       Patient's Medications   New Prescriptions    No medications on file   Previous Medications    No medications on file   Modified Medications    No medications on file   Discontinued Medications    BUPROPION (WELLBUTRIN XL) 300 MG XL TABLET    Take 1 Tab by mouth every morning.          A:  The patient denies taking any prescription or over the counter medications.      P:    No recommendations at this time.  Will await Psychiatry assessment and plan.        Eugenia Navarrete, Pharm.D., BCPS

## 2018-11-26 NOTE — ED PROVIDER NOTES
Patient reevaluated.  Patient is on a legal hold, waiting transfer to psychiatric facility when a bed is available.  Please refer to initial note for complete details.  No concerns overnight.  Patient ambulates to the bathroom independently and tolerated a meal.  Medication reconciliation has been reviewed.

## 2018-11-26 NOTE — ED NOTES
Report from Shaun WEEMS.     Pt resting in bed comfortably with NAD. Pt visible from nurses station and/or by sitter.

## 2018-11-26 NOTE — DISCHARGE PLANNING
Alert Team  Provided pt with information about going to Welfare office to sign up for Medicaid.  Current facesheet lists pt as uninsured.  His only options for f/u psychiatry and counseling are [facility] and [facility].  Should he get Medicaid, there are additional options listed on resource list.  Gave pt Crisis Safety Plan to fill out.    Addendum:  When I met with pt, he provided his BCBS id card.  I took it to the PARs, who updated his insurance information.  Gave pt updated resource list based on most current insurance information.  Advised pt to look for facility that does both medication management as well as therapy, as he should do both.

## 2018-11-26 NOTE — NON-PROVIDER
"PSYCHIATRIC CONSULTATION:  Reason for admission:  Depression; Anxiety; SI   Reason for consult: SI  Requesting Physician: MD Kevin    Legal status:  Voluntary    Chief Complaint: \"Just don't want to be alive anymore\"     HPI: 22 y/o male with history of untreated depression and anxiety who presents to the ED for evaluation of SI. He states he was accused of stealing at work, developed increasing anxiety, and punched a wall with his left hand at that time. The patient states he has tried psychiatric medications in the past with no alleviation, but repeatedly used marijuana to alleviate his anxiety. He has noticed his depression is exacerbated when he is alone. He states his home life is in disarray because his parents \"fight all the time\". The patient reports alcohol use on the weekends only. He denies associated homicidal ideations.       Psychiatric Review of Systems:current symptoms as reported by pt.  Depression: +depression +anhedonia   Rosy: no excessive lack of need for sleep  Anxiety/Panic Attacks +anxiety  PTSD symptom: None  Psychosis: None  Other: None       Medical Review of Systems: as reported by pt. All systems reviewed. Only those found to be + are noted below. All others are negative.   Neurological:    TBIs: None   SZs: None   Strokes: None   Other: None  Other medical symptoms:   Thyroid: None   Diabetes: None   Cardiovascular disease: None    Psychiatric Examination: observed phenomenon:  Vitals:   Vitals:    11/25/18 1056 11/25/18 1809 11/26/18 0002 11/26/18 0654   BP: 128/79 121/78 124/63 127/57   Pulse: (!) 101 (!) 107 97 74   Resp: 16 16 16 16   Temp: 36.1 °C (96.9 °F)      TempSrc: Temporal      SpO2:  92%  97%   Weight:         Musculoskeletal: No rigidity   Appearance: well groomed  Thoughts: linear  Speech: regular rate  Mood: appropriate  Affect: appropriate  SI/HI: SI  Attention/Alertness: appropriate  Memory: average  Orientation: appropraite  Fund of Knowledge: " "average  Insight/Judgement into symptoms: average  Neurological Testing:grossly intact      Past Psychiatric Hx: Anxiety; Depression    Family Psychiatric Hx: No pertinent Family History     Social Hx: No pertinent Social History     Drug/Alcohol/Tobacco Hx:   Drugs: THC   Alcohol: Socially    Tobacco: \"occasioanlly\"    Medical Hx: labs, MARS, medications, etc were reviewed. Only those findings of potential interest to psychiatry are noted below:    Allergies: Patient has no known allergies.  Medications: None   Labs: No results found for: SODIUM, POTASSIUM, CHLORIDE, CO2, GLUCOSE, BUN, CREATININE, BUNCREATRAT, GLOMRATE   ECG: QTc     ASSESSMENT: Ciro Rios is a 22 y/o. male with history of untreated depression and anxiety who presents to the ED for evaluation of suicidal ideations after a stressful situation at work.    PLAN:  Legal status: Voluntary   Anticipate D/C within 24 hours.   Start on hydroxyzine qd prn anxiety  F/U with outpatient psych   "

## 2018-11-26 NOTE — PSYCHIATRY
"PSYCHIATRIC CONSULTATION:  Reason for admission:  Depression; Anxiety; SI   Reason for consult: SI   Requesting Physician: MD Kevin     Legal status:  Legal 2000    Chief Complaint: \"Just don't want to be alive anymore\"       HPI: 22 y/o male with history of untreated depression and anxiety who presents to the ED for evaluation of SI. He states he was accused of stealing at work, developed increasing anxiety, and punched a wall with his left hand at that time. 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 He says he got \"a panic attack\" in the bathroom and flashed back to the time he spent in nursing home over a THC DUI. Mr. Rios says that when he went home, he tried to talk to his parents about the incident but felt they weren't understanding which exacerbated his anxiety and depression; he then took a knife and threatened to kill himself, and he was the one who urged his parents to call EMS for help. He says, \"If they didn't call the ambulance I don't think I'd be alive.\"  He has never been hospitalized for mental health reasons, and this was the first time he'd ever called an ambulance for SI.     The patient states he has tried psychiatric medications in the past with no alleviation, but repeatedly used marijuana to alleviate his anxiety. He has noticed his depression is exacerbated when he is alone. He states his home life is in disarray because his parents \"fight all the time\". The patient reports alcohol use on the weekends only. He denies associated homicidal ideations. He says, \"I consider myself a depressed person,\" but says he knows he needs help, is willing to start medications. He says what happened at home was \"an accumulation of a lot of things,\" including his parents fighting, his anxiety at work, and struggling to find the money to move out on his own.    Over the weekend he received Ativan for anxiety which helped, but also put him to sleep. Parents say that the " "situation at [facility] has been resolved and the missing money was found. Patient says that he was able to talk very openly about his emotions to his family over the weekend, and that they had a \"great conversation.\" He denies SI/HI today, says that he recognizes he needs help for his anxiety and depression. I met with his parents when they came to visit; they say that he looks better today, confirmed they had a good family meeting. They are eager for him to receive mental healthcare. The patient is also motivated to seek psychiatric care and therapy as well.      Psychiatric Review of Systems:  Depression: +depression +anhedonia   Rosy: no excessive lack of need for sleep   Anxiety/Panic Attacks +anxiety   PTSD symptom: None   Psychosis: None   Other: None      Medical Review of Systems: All systems reviewed. Only those found to be + are noted below. All others are negative.   Neurological:   TBIs: None   SZs: None   Strokes: None   Other: None   Other medical symptoms:   Thyroid: None   Diabetes: None   Cardiovascular disease: None     Psychiatric Examination:   Vitals: Weight/BMI: Body mass index is 32.28 kg/m². Blood pressure 130/78, pulse (!) 103, temperature 37 °C (98.6 °F), resp. rate 16, weight 93.5 kg (206 lb 2.1 oz), SpO2 92 %.   Vitals:    11/25/18 1809 11/26/18 0002 11/26/18 0654 11/26/18 1638   BP: 121/78 124/63 127/57 130/78   Pulse: (!) 107 97 74 (!) 103   Resp: 16 16 16 16   Temp:    37 °C (98.6 °F)   TempSrc:       SpO2: 92%  97% 92%   Weight:         Musculoskeletal: No rigidity   Appearance: well groomed   Thoughts: linear   Speech: regular rate   Mood: appropriate   Affect: appropriate   SI/HI: SI   Attention/Alertness: appropriate   Memory: average   Orientation: appropraite   Fund of Knowledge: average   Insight/Judgement into symptoms: average   Neurological Testing:grossly intact     Past Psychiatric Hx:   Previously seen by PCP in 2017 for depression/anxiety; was trialed on Effexor a long " "time ago and Wellbutrin a year ago. Took Wellbutrin x a few weeks but then stopped because insurance switched. Received a DUI for THC and completed a substance abuse treatment program. Has severe anxiety especially related to social situations and interacting with people. Tried to hang himself a long time ago, and also self-harmed years ago as well. Denies any previous psychiatric hospitalizations.     Family Psychiatric Hx:   Father with ETOH use disorder  Mother with possible OCD    Social Hx:  Lives at home with parents; has worked as a supervisor at [employer] for the past two years.     Drug/Alcohol/Tobacco Hx:  Drugs: THC   Alcohol: Socially   Tobacco: \"Occasionally\"     Medical Hx:   Past Medical History:   Diagnosis Date   • Psychiatric disorder     depression, Si attempt       Medications:  No current facility-administered medications on file prior to encounter.      No current outpatient prescriptions on file prior to encounter.       Labs:  N/A      ASSESSMENT:   1. Major depressive disorder, severe, with intermittent SI  2. Unspecified anxiety disorder  3. Rule out THC use disorder    Ciro Rios is a 22 y/o. male with history of untreated depression and anxiety who presents to the ED for evaluation of suicidal ideations after a stressful situation at work; he denies SI/HI today, and is future-oriented today with intention to seek out treatment as outpatient. Because he does not currently have follow-up, he will not be discharged on any psychotropic medications except for hydroxyzine as needed for anxiety. He has been instructed not to drive if he feels drowsy after taking this medication. He was given a list of outpatient mental health resources by alert team as well. Father was informed of this plan for discharge and he is in agreement, says he will go to counseling with his wife as well to improve their relationship and arguments at home.    PLAN:  Legal status: hold DC'd  Disposition: ready for " discharge    - Hydroxyzine 50 mg PRN anxiety    Above note was written by MS3 Ollie Barajas with additions by PGY-2 Ese Leon MD.

## 2018-11-26 NOTE — ED NOTES
Dr. Leon spoke with family at bedside. Clean sweep of room done after parents left. Pt calm and cooperative. No needs at this time.

## 2018-11-27 NOTE — ED NOTES
Pt. Clear for d/c, educated on d/c instructions. Pt provided with outpatient resources. Belongings given back. Pt family member at bedside to take pt home safely. Ambulated out of ED with steady gait.

## 2018-11-27 NOTE — ED PROVIDER NOTES
ED Provider Note    At approximately 4:35 PM I was informed by the psychiatric treatment team that they are taking him off his legal hold and that we can discharge him.  The follow-up plan of care is that he received outpatient services referral from [facility] and that he will see his primary care provider as well    1. Suicidal ideation    2. Marijuana abuse    3. Contusion of left hand, initial encounter

## 2018-11-28 ENCOUNTER — OFFICE VISIT (OUTPATIENT)
Dept: MEDICAL GROUP | Facility: MEDICAL CENTER | Age: 23
End: 2018-11-28
Payer: COMMERCIAL

## 2018-11-28 VITALS
BODY MASS INDEX: 32.33 KG/M2 | WEIGHT: 206 LBS | OXYGEN SATURATION: 94 % | HEIGHT: 67 IN | TEMPERATURE: 97.3 F | SYSTOLIC BLOOD PRESSURE: 130 MMHG | RESPIRATION RATE: 16 BRPM | HEART RATE: 85 BPM | DIASTOLIC BLOOD PRESSURE: 80 MMHG

## 2018-11-28 DIAGNOSIS — F41.9 ANXIETY: ICD-10-CM

## 2018-11-28 DIAGNOSIS — F33.2 SEVERE EPISODE OF RECURRENT MAJOR DEPRESSIVE DISORDER, WITHOUT PSYCHOTIC FEATURES (HCC): ICD-10-CM

## 2018-11-28 DIAGNOSIS — G47.01 INSOMNIA DUE TO MEDICAL CONDITION: ICD-10-CM

## 2018-11-28 PROBLEM — F32.9 MAJOR DEPRESSIVE DISORDER: Status: ACTIVE | Noted: 2018-11-28

## 2018-11-28 PROCEDURE — 99214 OFFICE O/P EST MOD 30 MIN: CPT | Performed by: NURSE PRACTITIONER

## 2018-11-28 RX ORDER — ESCITALOPRAM OXALATE 10 MG/1
TABLET ORAL
Qty: 60 TAB | Refills: 2 | Status: SHIPPED | OUTPATIENT
Start: 2018-11-28

## 2018-11-28 RX ORDER — TRAZODONE HYDROCHLORIDE 100 MG/1
100 TABLET ORAL NIGHTLY PRN
Qty: 30 TAB | Refills: 3 | Status: SHIPPED | OUTPATIENT
Start: 2018-11-28 | End: 2019-01-15 | Stop reason: SDUPTHER

## 2018-11-28 ASSESSMENT — PATIENT HEALTH QUESTIONNAIRE - PHQ9
5. POOR APPETITE OR OVEREATING: 1 - SEVERAL DAYS
SUM OF ALL RESPONSES TO PHQ QUESTIONS 1-9: 14
CLINICAL INTERPRETATION OF PHQ2 SCORE: 3

## 2018-11-28 NOTE — PROGRESS NOTES
cc: Hospital follow-up      Subjective:     HPI:     Ciro Bennett is a 23 y.o. male here to discuss the evaluation and management of:    Patient states that he is here today to follow-up on his recent hospital admission.  Patient had gone to the emergency room with thoughts of suicide.  Patient does have uncontrolled  depression and anxiety.  He states that his anxiety is uncontrolled which leads him to his depression.  Patient also has a history of alcohol abuse and marijuana use.  States the last time he had alcohol was about 2 weeks ago at about 1 week ago for marijuana.  Patient states that he is suffering from insomnia as well right now.  Currently denies any suicidal thoughts or ideations at this time.  He is interested in counseling and medication.  He has been taking the hydroxyzine since he left the hospital for anxiety.  He states that this is effective for him as well.  He does not have a supportive home environment as his father is an alcoholic and there is  disturbance at home.  He does still work daily.  He has been trying to exercise.        ROS:  Denies any Headache, Blurred Vision, Confusion, Chest pain,  Shortness of breath,  Abdominal pain, Changes of bowel or bladder, Lower ext edema, Fevers, Nights sweats, Weight Changes, Focal weakness or numbness.  And all other systems are negative.        Current Outpatient Prescriptions:   •  traZODone (DESYREL) 100 MG Tab, Take 1 Tab by mouth at bedtime as needed for Sleep., Disp: 30 Tab, Rfl: 3  •  escitalopram (LEXAPRO) 10 MG Tab, Take 10mg orally for 2 weeks then increase to 20mg daily thereafter, Disp: 60 Tab, Rfl: 2  •  hydrOXYzine pamoate (VISTARIL) 50 MG Cap, Take 1 Cap by mouth every 6 hours as needed for Anxiety., Disp: 90 Cap, Rfl: 3    No Known Allergies    Past Medical History:   Diagnosis Date   • Psychiatric disorder     depression, Si attempt     No past surgical history on file.  Family History   Problem Relation Age of Onset   •  "Alcohol abuse Father    • No Known Problems Sister         kidney tranplants   • Heart Attack Maternal Grandfather    • Diabetes Paternal Grandmother    • No Known Problems Paternal Grandfather         parkinsons     Social History     Social History   • Marital status: Single     Spouse name: N/A   • Number of children: N/A   • Years of education: N/A     Occupational History   • Not on file.     Social History Main Topics   • Smoking status: Former Smoker     Packs/day: 0.25     Years: 5.00     Types: Cigarettes     Quit date: 10/20/2017   • Smokeless tobacco: Never Used   • Alcohol use Yes      Comment: 40 oz malt /every other day   • Drug use: Yes     Types: Marijuana, Inhaled      Comment: THC   • Sexual activity: Not Currently     Other Topics Concern   • Not on file     Social History Narrative    Works at [employer] as a manager       Objective:     Vitals: /80   Pulse 85   Temp 36.3 °C (97.3 °F)   Resp 16   Ht 1.702 m (5' 7\")   Wt 93.4 kg (206 lb)   SpO2 94%   BMI 32.26 kg/m²    General: Alert, pleasant, NAD  HEENT: Normocephalic.    Skin: Warm, dry, no rashes.  Musculoskeletal: Gait is normal.  Moves all extremities well.  Extremities: No leg edema. No discoloration  Neurological: No tremors, sensation grossly intact, gait is normal,   Psych:  Affect/mood is normal, judgement is good, memory is intact, grooming is appropriate.    Assessment/Plan:     Ciro was seen today for hospital follow-up.    Diagnoses and all orders for this visit:    Severe episode of recurrent major depressive disorder, without psychotic features (HCC)  Not well controlled.  Was recently hospitalized for suicidal ideation.  PHQ 9 today in clinic was 14.  Have discussed with patient starting a low-dose medication to help with his anxiety as he states that this seems to be not well controlled and then causes him to have his depression.  Have also placed on the referral to psychology and psychiatry.  Have encouraged " patient to abstain from alcohol and marijuana.  He states he is going to abstain from these.  We will follow back up in 1 month.  -     escitalopram (LEXAPRO) 10 MG Tab; Take 10mg orally for 2 weeks then increase to 20mg daily thereafter  -     REFERRAL TO PSYCHOLOGY  -     REFERRAL TO PSYCHIATRY  -     Patient has been identified as being depressed and appropriate orders and counseling have been given     Insomnia due to medical condition  Have discussed with patient that I will give him some trazodone to take at night to see if this can help with his insomnia related to his depression and anxiety.  Will trial and have him follow back up.  Again avoid alcohol and illicit drugs.  -     traZODone (DESYREL) 100 MG Tab; Take 1 Tab by mouth at bedtime as needed for Sleep.  -     REFERRAL TO PSYCHOLOGY  -     REFERRAL TO PSYCHIATRY  -     Patient has been identified as being depressed and appropriate orders and counseling have been given    Anxiety  Would like to avoid using any sort of benzodiazepines as fear of addiction as he does have a history of alcohol and marijuana use.  -     escitalopram (LEXAPRO) 10 MG Tab; Take 10mg orally for 2 weeks then increase to 20mg daily thereafter                Return in about 6 weeks (around 1/9/2019).        Sumi NUNEZ

## 2018-12-03 PROBLEM — F41.9 ANXIETY: Chronic | Status: ACTIVE | Noted: 2017-11-03

## 2018-12-03 PROBLEM — F32.9 MAJOR DEPRESSIVE DISORDER: Status: RESOLVED | Noted: 2018-11-28 | Resolved: 2018-12-03

## 2018-12-03 PROBLEM — F33.2 SEVERE EPISODE OF RECURRENT MAJOR DEPRESSIVE DISORDER, WITHOUT PSYCHOTIC FEATURES (HCC): Chronic | Status: ACTIVE | Noted: 2017-11-03

## 2018-12-03 PROBLEM — G47.01 INSOMNIA DUE TO MEDICAL CONDITION: Status: ACTIVE | Noted: 2018-12-03

## 2018-12-10 ENCOUNTER — OFFICE VISIT (OUTPATIENT)
Dept: BEHAVIORAL HEALTH | Facility: CLINIC | Age: 23
End: 2018-12-10
Payer: COMMERCIAL

## 2018-12-10 DIAGNOSIS — F33.1 MODERATE EPISODE OF RECURRENT MAJOR DEPRESSIVE DISORDER (HCC): ICD-10-CM

## 2018-12-10 DIAGNOSIS — F41.1 GENERALIZED ANXIETY DISORDER WITH PANIC ATTACKS: ICD-10-CM

## 2018-12-10 DIAGNOSIS — F41.0 GENERALIZED ANXIETY DISORDER WITH PANIC ATTACKS: ICD-10-CM

## 2018-12-10 PROCEDURE — 90791 PSYCH DIAGNOSTIC EVALUATION: CPT | Performed by: PSYCHOLOGIST

## 2018-12-10 NOTE — BH THERAPY
[facility]  INITIAL ASSESSMENT    Name: Ciro Bennett  MRN: [id number]  : 1995  Age: 23 y.o.  Date of assessment: 12/10/2018  PCP: ENRIQUE Santos  Persons in attendance: Patient  Total session time: 45 minutes      CHIEF COMPLAINT AND HISTORY OF PRESENTING PROBLEM:  (as stated by Patient):  Ciro Bennett is a 23 year old,  or  male. The patient self-referred and voluntarily presented for an individual intake to address. Patient reported that he had an episode of anxiety and panic about three weeks ago which resulted in a brief hospitalization. He stated that he feels as if he has no direction in life, and has been increasingly depressed and anxious. He reported a history of depression and anxiety since middle school. Patient is seeking treatment for his depression and anxiety and appears quite motivated. Reviewed limits of confidentiality and [facility] policies; patient expressed understanding and agreed to voluntarily proceed with evaluation and treatment.        referred for assessment by No ref. provider found.  Primary presenting issue includes   Chief Complaint   Patient presents with   • Anxiety   • Stress   • Sleep Problem       FAMILY/SOCIAL HISTORY  Current living situation/household members: Patient currently lives with his biological parents.   Relevant family history/structure/dynamics: Patient's parents are still  and living in [place]. He has five siblings who are grown and living independently.  Current family/social stressors: None  Quality/quantity of current family and/or social support: Patient describes his family as a very good support system.  Does patient/parent report a family history of behavioral health issues, diagnoses, or treatment? Yes  Family History   Problem Relation Age of Onset   • Alcohol abuse Father    • No Known Problems Sister         kidney tranplants   • Heart Attack Maternal  Grandfather    • Diabetes Paternal Grandmother    • No Known Problems Paternal Grandfather         parkinsons        BEHAVIORAL HEALTH TREATMENT HISTORY  Does patient/parent report a history of prior behavioral health treatment for patient? Yes:    Dates Level of Care Facilty/Provider Diagnosis/Problem Medications   September 2016 - September 2018 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 Yes    MEDICAL HISTORY  Past medical/surgical history:   Past Medical History:   Diagnosis Date   • Anxiety    • Depression    • Panic disorder    • Psychiatric disorder     depression, Si attempt      No past surgical history on file.     Medication Allergies:   Patient has no known allergies.   Medical history provided by patient during current evaluation: Patient stated that he has no significant medical problems.    Patient reports last physical exam: 2018  Does patient/parent report any history of or current developmental concerns? No  Does patient/parent report nutritional concerns? No  Does patient/parent report change in appetite or weight loss/gain? No  Does patient/parent report history of eating disorder symptoms? No  Does patient/parent report dental problem? No  Does patient/parent report physical pain? No   Indicate if pain is acute or chronic, and location: N/A   Pain scale rating:       Does patient/parent report functional impact of medical, developmental, or pain issues?   N\A    EDUCATIONAL/LEARNING HISTORY  Is patient currently enrolled in a school/educational program? No:     Highest grade level completed: High school  School performance/functioning: N/A  History of Special Education/repeated grades/learning issues: no  Preferred learning style: auditory/visual   Current learning needs (large print, language barrier, etc):   None    EMPLOYMENT/RESOURCES  Is the patient currently employed? Yes  Does the patient/parent report adequate financial resources? Yes  Does patient identify impact of presenting issue on work functioning? No  Work or income-related stressors:  None     HISTORY:  Does patient report current or past enlistment? No    [If yes, complete below items]  Does patient report history of exposure to combat? No  Does patient report history of  sexual trauma? No  Does patient report other -related stressors? No    SPIRITUAL/CULTURAL/IDENTITY:  What are the patient’s/family’s spiritual beliefs or practices? Agnostic  What is the patient’s cultural or ethnic background/identity?   How does the patient identify their sexual orientation? heterosexual  How does the patient identify their gender? male  Does the patient identify any spiritual/cultural/identity factors as relevant to the presenting issue? No    LEGAL HISTORY  Has the patient ever been involved with juvenile, adult, or family legal systems? Yes   [If yes, trigger section below:]  Does patient report ever being a victim of a crime?  No  Does patient report involvement in any current legal issues?  No  Does patient report ever being arrested or committing a crime? No  Does patient report any current agency (parole/probation/CPS/) involvement? No    ABUSE/NEGLECT/TRAUMA SCREENING  Does patient report feeling “unsafe” in his/her home, or afraid of anyone? No  Does patient report any history of physical, sexual, or emotional abuse? No  Does parent or significant other report any of the above? No  Is there evidence of neglect by self? No  Is there evidence of neglect by a caregiver? No  Does the patient/parent report any history of CPS/APS/police involvement related to suspected abuse/neglect or domestic violence? No  Does the patient/parent report any other history of potentially traumatic life events? No  Based on the  information provided during the current assessment, is a mandated report of suspected abuse/neglect being made?  No     SAFETY ASSESSMENT - SELF  Does patient acknowledge current or past symptoms of dangerousness to self? Yes  Does parent/significant other report patient has current or past symptoms of dangerousness to self? Yes:     Past Current    Suicidal Thoughts: [x]  []    Suicidal Plans: []  []    Suicidal Intent: []  []    Suicide Attempts: []  []    Self-Injury []  []      For any boxes checked above, provide detail: Patient stated that he has had thoughts in the past which he related to feeling trapped in his current work situation and living with his family. Patient denied any intents or plans.    History of suicide by family member: no  History of suicide by friend/significant other: no  Recent change in frequency/specificity/intensity of suicidal thoughts or self-harm behavior? no  Current access to firearms, medications, or other identified means of suicide/self-harm? yes - Patient has access to sharps and medications, but denied any intent at this time.  If yes, willing to restrict access to means of suicide/self-harm? yes - If patient has thoughts in the future he will notify his parents to control all means. Explained the resources when he feels suicidal.  Protective factors present:  Future-oriented, Hopefulness, Strong family connections and Actively engaged in treatment      Recent change in frequency/specificity/intensity of suicidal thoughts or self-harm behavior? No  Current access to firearms, medications, or other identified means of suicide/self-harm? Yes  If yes, willing to restrict access to means of suicide/self-harm? Yes  Protective factors present: Future-oriented, Good impulse control, Hopefulness, Positive coping skills, Strong family connections and Actively engaged in treatment    Current Suicide Risk: Moderate  Crisis Safety Plan completed and copy given to patient: Yes    SAFETY  ASSESSMENT - OTHERS  Does paor past symptoms of aggressive behavior or risk to others? No  Does parent/significant othtient acknowledge current or past symptoms of aggressive behavior or risk to others? No  Does parent/significant other report patient has current or past symptoms of aggressive behavior or risk to others? No    Recent change in frequency/specificity/intensity of thoughts or threats to harm others? No  Current access to firearms/other identified means of harm? Yes  If yes, willing to restrict access to weapons/means of harm? Yes  Protective factors present: Fear of consequences, Guilt/remorse for past aggression, Positive impulse-control, Stable relationships and Stable employment    Current Homicide Risk:  Low  Crisis Safety Plan completed and copy given to patient? N/A  Based on information provided during the current assessment, is a mandated “duty to warn” being exercised? No    SUBSTANCE USE/ADDICTION HISTORY  [] Not applicable - patient 10 years of age or younger    Is there a family history of substance use/addiction? Yes  Does patient acknowledge or parent/significant other report use of/dependence on substances? Yes  Last time patient used alcohol: three weeks  Within the past week? No  Last time patient used marijuana: two weeks  Within the past month? Yes  Any other street drugs ever tried even once? No  Any use of prescription medications/pills without a prescription, or for reasons others than originally prescribed?  No  Any other addictive behavior reported (gambling, shopping, sex)? No     Drug History:  Amphetamine:  Amphetamine frequency: Never used    Cannibis:  Cannabis frequency: Past regular use  Cannabis last use: 11/26/18  Cannabis method: Smoke    Cocaine:  Cocaine frequency: Never used    Ecstasy:  Ecstasy frequency: Never used    Hallucinogen:  Hallucinogen frequency: Never used    Inhalant:   Inhalant frequency: Never used    Opiate:  Opiate frequency: Never used  Cannabis  frequency: Past regular use  Cannabis last use: 11/26/18    Other:  Other drug frequency: Never used    Sedative:   Sedative frequency: Never used    What consequences does the patient associate with any of the above substance use and or addictive behaviors? Legal: Patient has had one DUI and has completed substance abuse counseling and expresses a desire to remain drug and alcohol free.    Patient’s motivation/readiness for change: Future employment and health    [] Patient denies use of any substance/addictive behaviors    STRENGTHS/ASSETS  Strengths Identified by interviewer: Insight into problems, Evidence of good judgement, Self-awareness, Family suppport, Effeectively addressed past stressors/challenges, Social skills and History of effective treatment  Strengths Identified by patient: Motivated for treatment, supportive family    MENTAL STATUS/OBSERVATIONS   Participation: Active verbal participation, Attentive, Engaged and Open to feedback  Grooming: Casual and Neat  Orientation:Alert and Fully Oriented   Behavior: Calm  Eye contact: Good   Mood:Depressed and Anxious  Affect:Flexible  Thought process: Logical and Goal-directed  Thought content:  Within normal limits  Speech: Rate within normal limits and Volume within normal limits  Perception: Within normal limits  Memory: No gross evidence of memory deficits  Insight: Good  Judgment:  Good  Other:    Family/couple interaction observations: N/A    RESULTS OF SCREENING MEASURES:  Psychometric Test Results:     BHM-20  Global Mental Health   Severe Distress  Well Being Scale   Moderate Distress  Symptoms Scale   Moderate Distress  Anxiety Subscale  Moderate Distress  Depression Subscale  Severe Distress  Alcohol/Drug Subscale Severe Distress  Bipolar Subscale  Within Normal Limits  Eating Disorder Subscale Within Normal Limits  Harm to other Subscale Within Normal Limits  Suicide Monitoring Scale Low Risk  Life Functioning Scale :   Moderate  Distress    CLINICAL FORMULATION:   Ciro Bennett is a 23 year old,  or  male. The patient self-referred and voluntarily presented for an individual intake to address. Patient reported that he had an episode of anxiety and panic about three weeks ago which resulted in a brief hospitalization. He stated that he feels as if he has no direction in life, and has been increasingly depressed and anxious. He reported a history of depression and anxiety since middle school. Patient is seeking treatment for his depression and anxiety and appears quite motivated. Reviewed limits of confidentiality and [facility] policies; patient expressed understanding and agreed to voluntarily proceed with evaluation and treatment.    Patient did not present in acute distress. Patient was appropriately groomed and cooperative. Patient was alert and oriented to person, place, and time. Eye contact was appropriate. No abnormalities in attention or concentration were noted. No abnormalities of movement present; psychomotor activity was normal. Speech was fluent and regular in rhythm, rate, volume, and tone. Thought processes were linear, logical, and goal-directed. There was no evidence of thought disorder. No auditory or visual hallucinations. Long and short term memory appeared to be intact. Insight, judgment, and impulse control were deemed to be within normal limits. Reported mood was fairly positive. Affect was appropriate and congruent with thought content and conversation. Patient denied current suicidal and homicidal ideation in plan, intent, and preparatory behavior.        DIAGNOSTIC IMPRESSION(S):  1. Generalized anxiety disorder with panic attacks    2. Moderate episode of recurrent major depressive disorder (HCC)        IDENTIFIED NEEDS/PLAN:  [If any of these marked, trigger DISPOSITION list]  Mood/anxiety  Actively being addressed by [facility]      PLAN/DISPOSITION:    1) The patient will return to the clinic 2 weeks.  2) Crisis Response Plan:  Reviewed emergency resources with the patient and the patient expressed understanding including:  If feeling suicidal, patient will call or present to the [facility] during duty hours or present to closest ED ([facility] or [facility], call 911 or crisis hotline (9-057-160-KYGM) after duty hours.  3) Referrals/Consults:  N/A  4) Barriers to Learning:  No  5) Readiness to Learn:  Yes  6) Cultural Concerns:  No  7) Patient voiced understanding of, and agreement with, plan and goals as annotated above.  8) Declare these services are medically necessary and appropriate to the patient’s diagnosis and needs  9) The point of contact at the [facility] regarding this evaluation is Dr. Arce, Psychologist.       Does patient express agreement with the above plan? Yes     Referral appointment(s) scheduled? No       Efrain Arce, Ph.D.   Psychologist

## 2019-01-15 DIAGNOSIS — G47.01 INSOMNIA DUE TO MEDICAL CONDITION: ICD-10-CM

## 2019-01-15 RX ORDER — TRAZODONE HYDROCHLORIDE 100 MG/1
100 TABLET ORAL NIGHTLY PRN
Qty: 30 TAB | Refills: 3 | Status: SHIPPED | OUTPATIENT
Start: 2019-01-15

## 2022-05-17 ENCOUNTER — HOSPITAL ENCOUNTER (EMERGENCY)
Facility: MEDICAL CENTER | Age: 27
End: 2022-05-17
Attending: EMERGENCY MEDICINE
Payer: COMMERCIAL

## 2022-05-17 VITALS
TEMPERATURE: 98.2 F | OXYGEN SATURATION: 95 % | SYSTOLIC BLOOD PRESSURE: 128 MMHG | BODY MASS INDEX: 30.61 KG/M2 | DIASTOLIC BLOOD PRESSURE: 64 MMHG | HEIGHT: 68 IN | HEART RATE: 72 BPM | WEIGHT: 201.94 LBS | RESPIRATION RATE: 16 BRPM

## 2022-05-17 DIAGNOSIS — S61.011A LACERATION OF RIGHT THUMB WITHOUT FOREIGN BODY WITHOUT DAMAGE TO NAIL, INITIAL ENCOUNTER: ICD-10-CM

## 2022-05-17 PROCEDURE — 700111 HCHG RX REV CODE 636 W/ 250 OVERRIDE (IP): Performed by: EMERGENCY MEDICINE

## 2022-05-17 PROCEDURE — 90471 IMMUNIZATION ADMIN: CPT

## 2022-05-17 PROCEDURE — 303747 HCHG EXTRA SUTURE

## 2022-05-17 PROCEDURE — 304217 HCHG IRRIGATION SYSTEM

## 2022-05-17 PROCEDURE — 304999 HCHG REPAIR-SIMPLE/INTERMED LEVEL 1

## 2022-05-17 PROCEDURE — 99282 EMERGENCY DEPT VISIT SF MDM: CPT

## 2022-05-17 PROCEDURE — 90715 TDAP VACCINE 7 YRS/> IM: CPT | Performed by: EMERGENCY MEDICINE

## 2022-05-17 PROCEDURE — 700101 HCHG RX REV CODE 250: Performed by: EMERGENCY MEDICINE

## 2022-05-17 RX ADMIN — LIDOCAINE HYDROCHLORIDE 20 ML: 10 INJECTION, SOLUTION EPIDURAL; INFILTRATION; INTRACAUDAL; PERINEURAL at 11:15

## 2022-05-17 RX ADMIN — CLOSTRIDIUM TETANI TOXOID ANTIGEN (FORMALDEHYDE INACTIVATED), CORYNEBACTERIUM DIPHTHERIAE TOXOID ANTIGEN (FORMALDEHYDE INACTIVATED), BORDETELLA PERTUSSIS TOXOID ANTIGEN (GLUTARALDEHYDE INACTIVATED), BORDETELLA PERTUSSIS FILAMENTOUS HEMAGGLUTININ ANTIGEN (FORMALDEHYDE INACTIVATED), BORDETELLA PERTUSSIS PERTACTIN ANTIGEN, AND BORDETELLA PERTUSSIS FIMBRIAE 2/3 ANTIGEN 0.5 ML: 5; 2; 2.5; 5; 3; 5 INJECTION, SUSPENSION INTRAMUSCULAR at 11:26

## 2022-05-17 NOTE — ED TRIAGE NOTES
"Ciro Bennett 26 y.o. male ambulatory to triage for     Chief Complaint   Patient presents with   • Finger Pain     Pt reports sliced R thumb with a dry wall knife at work today.     Pt has R thumb wrapped with kerlix dressing, bleeding controlled.  Pt reports last tetanus > 5 years.  NAD, VSS.  /72   Pulse 68   Temp 36.4 °C (97.5 °F) (Temporal)   Resp 16   Ht 1.727 m (5' 8\")   Wt 91.6 kg (201 lb 15.1 oz)   SpO2 97%   BMI 30.71 kg/m²     "

## 2022-05-17 NOTE — ED NOTES
"Pt ambulated with steady gait to [facility] care area. Pt describes the top of his thumb as \"tingling\" to touch. Chart up for ERP.  "
ERP at bedside  
Medical student at bedside  
Pt provided with discharge instructions. Pt signed and stated he understood discharge instructions.   
Detail Level: Detailed
Size Of Lesion: 5 mm purple papule

## 2022-05-17 NOTE — LETTER
"  FORM C-4:  EMPLOYEE’S CLAIM FOR COMPENSATION/ REPORT OF INITIAL TREATMENT  EMPLOYEE’S CLAIM - PROVIDE ALL INFORMATION REQUESTED   First Name Ciro Last Name Sabrina Birthdate 1995  Sex male Claim Number   Home Address [address] [facility]             Zip [address]                                   Age  26 y.o. Height  1.727 m (5' 8\") Weight  91.6 kg (201 lb 15.1 oz) [facility]  [id number]  [id number]   Mailing Address [address] [facility]              Zip [address] Telephone  [phone] (home)  Primary Language Spoken  English   Insurer   Third Party  Employee's Occupation (Job Title) When Injury or Occupational Disease Occurred     Employer's Name [employer] Telephone [phone]    Employer Address [address] [facility] [address]] Zip [address]   Date of Injury  5/17/2022       Hour of Injury  9:20 AM Date Employer Notified  5/17/2022 Last Day of Work after Injury or Occupational Disease  5/17/2022 Supervisor to Whom Injury Reported  Eduardo   Address or Location of Accident (if applicable) Work [1]   What were you doing at the time of accident? (if applicable) cutting amanda    How did this injury or occupational disease occur? Be specific and answer in detail. Use additional sheet if necessary)  I was trying to cut a small piece or amanda when the knife slipped and cut me   If you believe that you have an occupational disease, when did you first have knowledge of the disability and it relationship to your employment? n/a Witnesses to the Accident  n/a   Nature of Injury or Occupational Disease  Workers' Compensation Part(s) of Body Injured or Affected  Thumb (R), N/A, N/A    I CERTIFY THAT THE ABOVE IS TRUE AND CORRECT TO THE BEST OF MY KNOWLEDGE AND THAT I HAVE PROVIDED THIS INFORMATION IN ORDER TO OBTAIN THE BENEFITS OF [place] INCLUSIVE OR CHAPTER 617 OF " [place]RS).  I HEREBY AUTHORIZE ANY PHYSICIAN, CHIROPRACTOR, SURGEON, PRACTITIONER, OR OTHER PERSON, ANY HOSPITAL, INCLUDING [facility] OR [facility], ANY MEDICAL SERVICE ORGANIZATION, ANY INSURANCE COMPANY, OR OTHER INSTITUTION OR ORGANIZATION TO RELEASE TO EACH OTHER, ANY MEDICAL OR OTHER INFORMATION, INCLUDING BENEFITS PAID OR PAYABLE, PERTINENT TO THIS INJURY OR DISEASE, EXCEPT INFORMATION RELATIVE TO DIAGNOSIS, TREATMENT AND/OR COUNSELING FOR AIDS, PSYCHOLOGICAL CONDITIONS, ALCOHOL OR CONTROLLED SUBSTANCES, FOR WHICH I MUST GIVE SPECIFIC AUTHORIZATION.  A PHOTOSTAT OF THIS AUTHORIZATION SHALL BE AS VALID AS THE ORIGINAL.  Date  5/17/22  [facility]    Employee’s Signature   THIS REPORT MUST BE COMPLETED AND MAILED WITHIN 3 WORKING DAYS OF TREATMENT   [facility], EMERGENCY DEPT                       Name of Facility [facility]   Date  5/17/2022 Diagnosis  (S61.011A) Laceration of right thumb without foreign body without damage to nail, initial encounter Is there evidence the injured employee was under the influence of alcohol and/or another controlled substance at the time of accident?   Hour  12:15 PM Description of Injury or Disease  Laceration of right thumb without foreign body without damage to nail, initial encounter No   Treatment  sutures  Have you advised the patient to remain off work five days or more?         No   X-Ray Findings    If Yes   From Date    To Date      From information given by the employee, together with medical evidence, can you directly connect this injury or occupational disease as job incurred? Yes If No, is employee capable of: Full Duty  No Modified Duty  Yes   Is additional medical care by a physician indicated? Yes If Modified Duty, Specify any Limitations / Restrictions   Limited use of right hand for 1 week   Do you know of any previous injury or disease contributing to this condition or occupational disease? No   "  Date 5/17/2022 Print Doctor’s Name Cirilo Garzon I certify the employer’s copy of this form was mailed on:   Address [address] [place] [id number] [phone] INSURER’S USE ONLY   Provider’s Tax ID Number [id number] Telephone Dept: [phone]    Doctor’s Signature e-CIRILO Ventura M.D. Degree MD        Form C-4 (rev.10/07)                                                                         BRIEF DESCRIPTION OF RIGHTS AND BENEFITS  (Pursuant to [place] 616C.050)    Notice of Injury or Occupational Disease (Incident Report Form C-1): If an injury or occupational disease (OD) arises out of and in the course of employment, you must provide written notice to your employer as soon as practicable, but no later than 7 days after the accident or OD. Your employer shall maintain a sufficient supply of the required forms.    Claim for Compensation (Form C-4): If medical treatment is sought, the form C-4 is available at the place of initial treatment. A completed \"Claim for Compensation\" (Form C-4) must be filed within 90 days after an accident or OD. The treating physician or chiropractor must, within 3 working days after treatment, complete and mail to the employer, the employer's insurer and third-party , the Claim for Compensation.    Medical Treatment: If you require medical treatment for your on-the-job injury or OD, you may be required to select a physician or chiropractor from a list provided by your workers’ compensation insurer, if it has contracted with an Organization for Managed Care (MCO) or Preferred Provider Organization (PPO) or providers of health care. If your employer has not entered into a contract with an MCO or PPO, you may select a physician or chiropractor from the Panel of Physicians and Chiropractors. Any medical costs related to your industrial injury or OD will be paid by your insurer.    Temporary Total Disability (TTD): If your doctor has certified that you are " unable to work for a period of at least 5 consecutive days, or 5 cumulative days in a 20-day period, or places restrictions on you that your employer does not accommodate, you may be entitled to TTD compensation.    Temporary Partial Disability (TPD): If the wage you receive upon reemployment is less than the compensation for TTD to which you are entitled, the insurer may be required to pay you TPD compensation to make up the difference. TPD can only be paid for a maximum of 24 months.    Permanent Partial Disability (PPD): When your medical condition is stable and there is an indication of a PPD as a result of your injury or OD, within 30 days, your insurer must arrange for an evaluation by a rating physician or chiropractor to determine the degree of your PPD. The amount of your PPD award depends on the date of injury, the results of the PPD evaluation, your age and wage.    Permanent Total Disability (PTD): If you are medically certified by a treating physician or chiropractor as permanently and totally disabled and have been granted a PTD status by your insurer, you are entitled to receive monthly benefits not to exceed 66 2/3% of your average monthly wage. The amount of your PTD payments is subject to reduction if you previously received a lump-sum PPD award.    Vocational Rehabilitation Services: You may be eligible for vocational rehabilitation services if you are unable to return to the job due to a permanent physical impairment or permanent restrictions as a result of your injury or occupational disease.    Transportation and Per Toño Reimbursement: You may be eligible for travel expenses and per toño associated with medical treatment.    Reopening: You may be able to reopen your claim if your condition worsens after claim closure.     Appeal Process: If you disagree with a written determination issued by the insurer or the insurer does not respond to your request, you may appeal to the Department of  Administration, , by following the instructions contained in your determination letter. You must appeal the determination within 70 days from the date of the determination letter at [address], [address], [place], [place] [address], or [address], [address], [place], [place] [address]. If you disagree with the  decision, you may appeal to the Department of Administration, . You must file your appeal within 30 days from the date of the  decision letter at [address], [address], [place], [place] [address], or [address], [address], [place], [place] [address]. If you disagree with a decision of an , you may file a petition for judicial review with the District Court. You must do so within 30 days of the Appeal Officer’s decision. You may be represented by an  at your own expense or you may contact the [facility] for possible representation.    [place]  for Injured Workers (NAIW): If you disagree with a  decision, you may request that NAIW represent you without charge at an  Hearing. For information regarding denial of benefits, you may contact the [facility] at: [address], [address], [place] [address], [phone], or [address], [address], [place] [address], [phone]    To File a Complaint with the Division: If you wish to file a complaint with the  of the Division of Industrial Relations (DIR),  please contact the Workers’ Compensation Section, [address], [address], [place], [place] [address], telephone [phone], or [address], [address], [place], [place] [address], telephone [phone].    For assistance with Workers’ Compensation Issues: You may contact the [facility] Office for Consumer Health Assistance, [address], [address], [place], [place] [address], Toll Free  4-282-950-9498, Web site: http://[facility].[place].gov/Programs/LEONEL E-mail: leonel@[facility].nv.gov  D-2 (rev. 10/20)              __________________________________________________________________                                    _________________            Employee Name / Signature                                                                                                                            Date

## 2022-05-17 NOTE — ED PROVIDER NOTES
"ED Provider Note    ER PROVIDER NOTE      CHIEF COMPLAINT  Chief Complaint   Patient presents with   • Finger Pain     Pt reports sliced R thumb with a dry wall knife at work today.       HPI  Ciro Bennett is a 26 y.o. male who presents to the emergency department complaining of laceration to his right thumb.  While at work earlier today, patient was using a drywall knife and sliced his right thumb.  He reports no other injury, no focal weakness numbness or tingling.  Unsure of tetanus    REVIEW OF SYSTEMS  Pertinent positives include laceration. Pertinent negatives include no numbness. See HPI for details. All other systems reviewed and are negative.    PAST MEDICAL HISTORY   has a past medical history of Anxiety, Depression, Panic disorder, and Psychiatric disorder.    SOCIAL HISTORY  Social History     Tobacco Use   • Smoking status: Former Smoker     Packs/day: 0.25     Years: 5.00     Pack years: 1.25     Types: Cigarettes     Quit date: 11/20/2018     Years since quitting: 3.4   • Smokeless tobacco: Never Used   Substance Use Topics   • Alcohol use: Yes     Comment: 40 oz malt /every other day   • Drug use: Yes     Types: Marijuana, Inhaled     Comment: States he quit 1 week ago       SURGICAL HISTORY  patient denies any surgical history    CURRENT MEDICATIONS  Home Medications    **Home medications have not yet been reviewed for this encounter**         ALLERGIES  No Known Allergies    PHYSICAL EXAM  VITAL SIGNS: /72   Pulse 68   Temp 36.4 °C (97.5 °F) (Temporal)   Resp 16   Ht 1.727 m (5' 8\")   Wt 91.6 kg (201 lb 15.1 oz)   SpO2 97%   BMI 30.71 kg/m²   Pulse ox interpretation: I interpret this pulse ox as normal.    Constitutional: Alert.  In no apparent distress.  HENT: Normocephalic, Atraumatic, Bilateral external ears normal. Nose normal.   Eyes: Pupils are equal and reactive. Conjunctiva normal, non-icteric.   Heart: Regular rate and rhythm, no murmurs.    Lungs: Clear to auscultation " bilaterally.  Skin: Warm, Dry, No erythema, No rash.   Musculoskeletal: There is a 4 and half centimeter laceration on the radial aspect of the right thumb, appears superficial in nature, not gaping, patient has full strength with isolated flexion and extension at the MCP and interphalangeal joint of that digit, as well as intact strength with abduction abduction and opposition otherwise no tenderness or major deformities noted. No edema.  Distal capillary refills less than 2 seconds, distal sensation intact to light touch  Neurologic: Alert, Grossly non-focal.   Psychiatric: Affect normal, Judgment normal, Mood normal, Appears appropriate    DIAGNOSTIC STUDIES / PROCEDURES        RADIOLOGY  No orders to display     The radiologist's interpretation of all radiological studies have been reviewed and independently viewed by me.    COURSE & MEDICAL DECISION MAKING  Nursing notes, VS, PMSFHx reviewed in chart.    10:42 AM - Patient seen and examined at bedside. Patient will be treated with tdap lidocaine.     Laceration Repair Procedure    Indication: Laceration    Location/Description: r thumb    Procedure: The patient was placed in the appropriate position and anesthesia around the laceration was obtained by infiltration using 1% Lidocaine without epinephrine. The area was then irrigated with normal saline. The laceration was closed with 4-0 Ethilon using interrupted sutures. There were no additional lacerations requiring repair. The wound area was then dressed with bacitracin and a sterile dressing.      Total repaired wound length: 4.5 cm.     Other Items: Suture count: 9    The patient tolerated the procedure well.    Complications: None          Decision Making:  This is a 26 y.o. male presenting with thumb laceration.  This was repaired as above.  There is no evidence of foreign body.  No evidence of neurovascular compromise.  No evidence of tendinous injury or deep space injury.  His tetanus has been updated, he  has been educated on wound care will follow up with occupational health for suture removal     The patient will return for new or worsening symptoms and is stable at the time of discharge.    The patient is referred to a primary physician for blood pressure management, diabetic screening, and for all other preventative health concerns.      DISPOSITION:  Patient will be discharged home in stable condition.    FOLLOW UP:  [place] [id number]  [place] [id number]  [phone]  In 1 week  For suture removal      OUTPATIENT MEDICATIONS:  New Prescriptions    No medications on file         FINAL IMPRESSION  1. Laceration of right thumb without foreign body without damage to nail, initial encounter         The note accurately reflects work and decisions made by me.  Myron Garzon M.D.  5/17/2022  12:16 PM

## 2022-05-24 ENCOUNTER — HOSPITAL ENCOUNTER (EMERGENCY)
Facility: MEDICAL CENTER | Age: 27
End: 2022-05-24
Attending: EMERGENCY MEDICINE
Payer: COMMERCIAL

## 2022-05-24 VITALS
SYSTOLIC BLOOD PRESSURE: 128 MMHG | HEART RATE: 60 BPM | HEIGHT: 68 IN | BODY MASS INDEX: 31.07 KG/M2 | WEIGHT: 205.03 LBS | RESPIRATION RATE: 14 BRPM | TEMPERATURE: 97.7 F | DIASTOLIC BLOOD PRESSURE: 69 MMHG | OXYGEN SATURATION: 97 %

## 2022-05-24 DIAGNOSIS — Z48.02 VISIT FOR SUTURE REMOVAL: ICD-10-CM

## 2022-05-24 PROCEDURE — 99282 EMERGENCY DEPT VISIT SF MDM: CPT

## 2022-05-24 NOTE — ED PROVIDER NOTES
ED Provider Note    CHIEF COMPLAINT  Chief Complaint   Patient presents with   • Suture Removal     Rt thumb       HPI  Ciro Montiel is a 26 y.o. male who presents for evaluation of wound check and suture removal.  The patient was seen here 9 days ago with a moderate sized laceration on the lateral aspect of the right thumb.  He underwent washout and primary closure with sutures.  He is here for the sutures to be removed.  He denies any fevers chills redness or pus noted in the wound bed.  No other complaints    REVIEW OF SYSTEMS  See HPI for further details.  No high fevers chills night sweats weight loss all other systems are negative.     PAST MEDICAL HISTORY  Past Medical History:   Diagnosis Date   • Anxiety    • Depression    • Panic disorder    • Psychiatric disorder     depression, Si attempt       FAMILY HISTORY  Noncontributory    SOCIAL HISTORY  Social History     Socioeconomic History   • Marital status: Single   Tobacco Use   • Smoking status: Former Smoker     Packs/day: 0.25     Years: 5.00     Pack years: 1.25     Types: Cigarettes     Quit date: 11/20/2018     Years since quitting: 3.5   • Smokeless tobacco: Never Used   Substance and Sexual Activity   • Alcohol use: Yes     Comment: 40 oz malt /every other day   • Drug use: Yes     Types: Marijuana, Inhaled     Comment: States he quit 1 week ago   • Sexual activity: Not Currently   Social History Narrative    Works at [employer] as a manager     No IV drugs  SURGICAL HISTORY  No past surgical history on file.  No major surgeries   CURRENT MEDICATIONS  Home Medications     Reviewed by Chelsea Haque R.N. (Registered Nurse) on 05/24/22 at 1542  Med List Status: Partial   Medication Last Dose Status   escitalopram (LEXAPRO) 10 MG Tab  Active   hydrOXYzine pamoate (VISTARIL) 50 MG Cap  Active   traZODone (DESYREL) 100 MG Tab  Active                ALLERGIES  No Known Allergies    PHYSICAL EXAM  VITAL SIGNS: /66   Pulse 72   Temp 36.3  "°C (97.4 °F) (Temporal)   Resp 16   Ht 1.727 m (5' 8\")   Wt 93 kg (205 lb 0.4 oz)   SpO2 96%   BMI 31.17 kg/m²       Constitutional: Well developed, Well nourished, No acute distress, Non-toxic appearance.   HENT: Normocephalic, Atraumatic, Bilateral external ears normal, Oropharynx moist, No oral exudates, Nose normal.   Eyes: PERRLA, EOMI, Conjunctiva normal, No discharge.   Neck: Normal range of motion, No tenderness, Supple, No stridor.   Cardiovascular: Normal heart rate, Normal rhythm, No murmurs, No rubs, No gallops.   Thorax & Lungs: Normal breath sounds, No respiratory distress, No wheezing, No chest tenderness.   Abdomen: Bowel sounds normal, Soft, No tenderness, No masses, No pulsatile masses.   Skin: Warm, Dry, No erythema, No rash.   Extremities: I right upper extremity exam is notable for a 4.5 cm laceration on the lateral aspect of the right thumb.  No dehiscence no erythema no pus   neurologic: Alert & oriented x 3, Normal motor function, Normal sensory function, No focal deficits noted.   Psychiatric: Anxious       COURSE & MEDICAL DECISION MAKING  Pertinent Labs & Imaging studies reviewed. (See chart for details)  Wound was gently irrigated with sterile saline to loosen up some of the crusting.  I personally removed all 9 of the sutures.  There is no dehiscence.  No erythema or pus.  I provided the patient with supplies for a wound bandage.  No indication for antibiotic therapy.    FINAL IMPRESSION  1.  Encounter for wound check and suture removal of right thumb      Electronically signed by: Martín Rain M.D., 5/24/2022 4:29 PM    "

## 2023-02-20 ENCOUNTER — OFFICE VISIT (OUTPATIENT)
Dept: URGENT CARE | Facility: CLINIC | Age: 28
End: 2023-02-20

## 2023-02-20 VITALS
BODY MASS INDEX: 34.16 KG/M2 | RESPIRATION RATE: 14 BRPM | WEIGHT: 225.4 LBS | DIASTOLIC BLOOD PRESSURE: 54 MMHG | OXYGEN SATURATION: 95 % | HEART RATE: 89 BPM | SYSTOLIC BLOOD PRESSURE: 122 MMHG | TEMPERATURE: 97.3 F | HEIGHT: 68 IN

## 2023-02-20 DIAGNOSIS — J02.0 STREP PHARYNGITIS: ICD-10-CM

## 2023-02-20 DIAGNOSIS — J02.9 SORE THROAT: ICD-10-CM

## 2023-02-20 LAB
INT CON NEG: NORMAL
INT CON POS: NORMAL
S PYO AG THROAT QL: POSITIVE

## 2023-02-20 PROCEDURE — 99203 OFFICE O/P NEW LOW 30 MIN: CPT | Performed by: NURSE PRACTITIONER

## 2023-02-20 PROCEDURE — 87880 STREP A ASSAY W/OPTIC: CPT | Performed by: NURSE PRACTITIONER

## 2023-02-20 RX ORDER — PENICILLIN V POTASSIUM 500 MG/1
500 TABLET ORAL 3 TIMES DAILY
Qty: 30 TABLET | Refills: 0 | Status: SHIPPED | OUTPATIENT
Start: 2023-02-20 | End: 2023-03-02

## 2023-02-20 ASSESSMENT — ENCOUNTER SYMPTOMS
SORE THROAT: 1
COUGH: 1
EYE DISCHARGE: 1

## 2023-02-20 NOTE — LETTER
February 20, 2023    To Whom It May Concern:         This is confirmation that Ciro Callowayquez Dinesh attended his scheduled appointment with ENRIQUE Bland on 2/20/23.    Please excuse him from work today. He can return to work 2/21/23.    Sincerely,      LUZ ELENA Bland.  [phone]

## 2023-02-20 NOTE — PROGRESS NOTES
Subjective     Ciro Montiel is a 27 y.o. male who presents with Eye Drainage (X 2 days) and Sore Throat (X 2-3 days, cough, congestion, ear pressure with swallowing)            Pharyngitis   This is a new problem. Episode onset: reports new onset of ST and coughing that started 3 days ago. 2 days later he noted sinus congestion and crusted eyes. His ears feel full and plugged. no fever or chills. no other URI symptoms. Associated symptoms include congestion and coughing. Associated symptoms comments: Recent sick contact last week with similar symptoms. Cough is dry. He does vape marijuana daily. Treatments tried: nyquil at night, cough drops. The treatment provided mild relief.     Review of Systems   HENT:  Positive for congestion and sore throat.    Eyes:  Positive for discharge.   Respiratory:  Positive for cough.    All other systems reviewed and are negative.         Past Medical History:   Diagnosis Date    Anxiety     Depression     Panic disorder     Psychiatric disorder     depression, Si attempt    History reviewed. No pertinent surgical history.   Social History     Socioeconomic History    Marital status: Single     Spouse name: Not on file    Number of children: Not on file    Years of education: Not on file    Highest education level: Not on file   Occupational History    Not on file   Tobacco Use    Smoking status: Former     Packs/day: 0.25     Years: 5.00     Pack years: 1.25     Types: Cigarettes     Quit date: 2018     Years since quittin.2    Smokeless tobacco: Never   Vaping Use    Vaping Use: Every day    Substances: THC    Devices: Pre-filled or refillable cartridge   Substance and Sexual Activity    Alcohol use: Yes     Comment: weekends    Drug use: Yes     Frequency: 7.0 times per week     Types: Marijuana, Inhaled    Sexual activity: Not Currently   Other Topics Concern    Not on file   Social History Narrative    Works at [employer] as a manager     Social Determinants of  "Health     Financial Resource Strain: Not on file   Food Insecurity: Not on file   Transportation Needs: Not on file   Physical Activity: Not on file   Stress: Not on file   Social Connections: Not on file   Intimate Partner Violence: Not on file   Housing Stability: Not on file       Objective     /54   Pulse 89   Temp 36.3 °C (97.3 °F) (Temporal)   Resp 14   Ht 1.727 m (5' 8\") Comment: per pt  Wt 102 kg (225 lb 6.4 oz) Comment: w/ shoes  SpO2 95%   BMI 34.27 kg/m²      Physical Exam  Vitals and nursing note reviewed.   Constitutional:       Appearance: Normal appearance.   HENT:      Head: Normocephalic and atraumatic.      Right Ear: Tympanic membrane and external ear normal.      Left Ear: Tympanic membrane and external ear normal.      Nose: Congestion present.      Mouth/Throat:      Mouth: Mucous membranes are moist.      Pharynx: Oropharynx is clear. Posterior oropharyngeal erythema present.   Eyes:      Extraocular Movements: Extraocular movements intact.      Pupils: Pupils are equal, round, and reactive to light.   Cardiovascular:      Rate and Rhythm: Normal rate and regular rhythm.   Pulmonary:      Effort: Pulmonary effort is normal.      Breath sounds: Normal breath sounds.   Musculoskeletal:         General: Normal range of motion.      Cervical back: Normal range of motion and neck supple.   Lymphadenopathy:      Head:      Right side of head: Tonsillar adenopathy present.      Left side of head: Tonsillar adenopathy present.   Skin:     General: Skin is warm and dry.      Capillary Refill: Capillary refill takes less than 2 seconds.   Neurological:      General: No focal deficit present.      Mental Status: He is alert and oriented to person, place, and time. Mental status is at baseline.   Psychiatric:         Mood and Affect: Mood normal.         Thought Content: Thought content normal.         Judgment: Judgment normal.                           Assessment & Plan        1. Sore " throat  - POCT Rapid Strep A POSITIVE    2. Strep pharyngitis  - penicillin v potassium (VEETID) 500 MG Tab; Take 1 Tablet by mouth 3 times a day for 10 days.  Dispense: 30 Tablet; Refill: 0       Take full course of abx  Warm salt water gargles  Alternate tylenol and ibuprofen for pain  Soft foods and cool liquids  Throat lozenges as directed  Continue OTC meds as needed for other symptom relief  Supportive care, differential diagnoses, and indications for immediate follow-up discussed with patient.    Pathogenesis of diagnosis discussed including typical length and natural progression.    Instructed to return to  or nearest emergency department if symptoms fail to improve, for any change in condition, further concerns, or new concerning symptoms.  Patient states understanding of the plan of care and discharge instructions.